# Patient Record
Sex: FEMALE | Race: WHITE | Employment: OTHER | URBAN - METROPOLITAN AREA
[De-identification: names, ages, dates, MRNs, and addresses within clinical notes are randomized per-mention and may not be internally consistent; named-entity substitution may affect disease eponyms.]

---

## 2021-06-18 ENCOUNTER — HOSPITAL ENCOUNTER (INPATIENT)
Age: 66
LOS: 10 days | Discharge: HOME | DRG: 947 | End: 2021-06-28
Attending: PHYSICAL MEDICINE & REHABILITATION | Admitting: PHYSICAL MEDICINE & REHABILITATION
Payer: MEDICARE

## 2021-06-18 PROBLEM — K92.2 UPPER GI BLEED: Status: ACTIVE | Noted: 2021-06-18

## 2021-06-18 LAB
GLUCOSE BLD-MCNC: 137 MG/DL (ref 70–99)
GLUCOSE BLD-MCNC: 147 MG/DL (ref 70–99)
PERFORMED ON: ABNORMAL
PERFORMED ON: ABNORMAL

## 2021-06-18 PROCEDURE — 1280000000 HC REHAB R&B

## 2021-06-18 PROCEDURE — 94760 N-INVAS EAR/PLS OXIMETRY 1: CPT

## 2021-06-18 PROCEDURE — 6370000000 HC RX 637 (ALT 250 FOR IP): Performed by: PHYSICAL MEDICINE & REHABILITATION

## 2021-06-18 RX ORDER — PANTOPRAZOLE SODIUM 40 MG/1
40 TABLET, DELAYED RELEASE ORAL
COMMUNITY

## 2021-06-18 RX ORDER — INSULIN GLARGINE 100 [IU]/ML
15 INJECTION, SOLUTION SUBCUTANEOUS NIGHTLY
Status: DISCONTINUED | OUTPATIENT
Start: 2021-06-18 | End: 2021-06-21

## 2021-06-18 RX ORDER — ACETAMINOPHEN 325 MG/1
650 TABLET ORAL EVERY 6 HOURS PRN
Status: DISCONTINUED | OUTPATIENT
Start: 2021-06-18 | End: 2021-06-28 | Stop reason: HOSPADM

## 2021-06-18 RX ORDER — DEXTROSE MONOHYDRATE 25 G/50ML
12.5 INJECTION, SOLUTION INTRAVENOUS PRN
Status: DISCONTINUED | OUTPATIENT
Start: 2021-06-18 | End: 2021-06-28 | Stop reason: HOSPADM

## 2021-06-18 RX ORDER — METOPROLOL SUCCINATE 50 MG/1
50 TABLET, EXTENDED RELEASE ORAL 2 TIMES DAILY
Status: ON HOLD | COMMUNITY
End: 2021-06-21 | Stop reason: ALTCHOICE

## 2021-06-18 RX ORDER — POLYETHYLENE GLYCOL 3350 17 G/17G
17 POWDER, FOR SOLUTION ORAL DAILY PRN
Status: DISCONTINUED | OUTPATIENT
Start: 2021-06-18 | End: 2021-06-28 | Stop reason: HOSPADM

## 2021-06-18 RX ORDER — HYDRALAZINE HYDROCHLORIDE 25 MG/1
25 TABLET, FILM COATED ORAL EVERY 6 HOURS PRN
Status: DISCONTINUED | OUTPATIENT
Start: 2021-06-18 | End: 2021-06-28 | Stop reason: HOSPADM

## 2021-06-18 RX ORDER — DEXTROSE MONOHYDRATE 50 MG/ML
100 INJECTION, SOLUTION INTRAVENOUS PRN
Status: DISCONTINUED | OUTPATIENT
Start: 2021-06-18 | End: 2021-06-28 | Stop reason: HOSPADM

## 2021-06-18 RX ORDER — ATORVASTATIN CALCIUM 40 MG/1
40 TABLET, FILM COATED ORAL NIGHTLY
COMMUNITY

## 2021-06-18 RX ORDER — ONDANSETRON 4 MG/1
4 TABLET, FILM COATED ORAL EVERY 8 HOURS PRN
COMMUNITY

## 2021-06-18 RX ORDER — LANOLIN ALCOHOL/MO/W.PET/CERES
3 CREAM (GRAM) TOPICAL NIGHTLY PRN
Status: DISCONTINUED | OUTPATIENT
Start: 2021-06-18 | End: 2021-06-28 | Stop reason: HOSPADM

## 2021-06-18 RX ORDER — MULTIVIT WITH MINERALS/LUTEIN
250 TABLET ORAL DAILY
COMMUNITY

## 2021-06-18 RX ORDER — BISACODYL 10 MG
10 SUPPOSITORY, RECTAL RECTAL DAILY PRN
Status: DISCONTINUED | OUTPATIENT
Start: 2021-06-18 | End: 2021-06-28 | Stop reason: HOSPADM

## 2021-06-18 RX ORDER — NIFEDIPINE 60 MG/1
60 TABLET, EXTENDED RELEASE ORAL DAILY
Status: ON HOLD | COMMUNITY
End: 2021-06-28 | Stop reason: HOSPADM

## 2021-06-18 RX ORDER — CETIRIZINE HYDROCHLORIDE 10 MG/1
10 TABLET ORAL DAILY
COMMUNITY

## 2021-06-18 RX ORDER — ATORVASTATIN CALCIUM 40 MG/1
40 TABLET, FILM COATED ORAL NIGHTLY
Status: DISCONTINUED | OUTPATIENT
Start: 2021-06-18 | End: 2021-06-28 | Stop reason: HOSPADM

## 2021-06-18 RX ORDER — PANTOPRAZOLE SODIUM 40 MG/1
40 TABLET, DELAYED RELEASE ORAL
Status: DISCONTINUED | OUTPATIENT
Start: 2021-06-19 | End: 2021-06-28 | Stop reason: HOSPADM

## 2021-06-18 RX ORDER — SENNA AND DOCUSATE SODIUM 50; 8.6 MG/1; MG/1
1 TABLET, FILM COATED ORAL 2 TIMES DAILY
Status: DISCONTINUED | OUTPATIENT
Start: 2021-06-18 | End: 2021-06-28 | Stop reason: HOSPADM

## 2021-06-18 RX ORDER — CETIRIZINE HYDROCHLORIDE 10 MG/1
10 TABLET ORAL DAILY PRN
Status: DISCONTINUED | OUTPATIENT
Start: 2021-06-18 | End: 2021-06-28 | Stop reason: HOSPADM

## 2021-06-18 RX ORDER — LEVOTHYROXINE SODIUM 0.12 MG/1
125 TABLET ORAL
Status: DISCONTINUED | OUTPATIENT
Start: 2021-06-19 | End: 2021-06-28 | Stop reason: HOSPADM

## 2021-06-18 RX ORDER — POLYETHYLENE GLYCOL 3350 17 G/17G
17 POWDER, FOR SOLUTION ORAL 2 TIMES DAILY PRN
COMMUNITY

## 2021-06-18 RX ORDER — ONDANSETRON 4 MG/1
4 TABLET, FILM COATED ORAL EVERY 8 HOURS PRN
Status: DISCONTINUED | OUTPATIENT
Start: 2021-06-18 | End: 2021-06-28 | Stop reason: HOSPADM

## 2021-06-18 RX ORDER — AZELASTINE 1 MG/ML
1 SPRAY, METERED NASAL 2 TIMES DAILY
COMMUNITY

## 2021-06-18 RX ORDER — ACETAMINOPHEN 325 MG/1
650 TABLET ORAL EVERY 4 HOURS PRN
COMMUNITY

## 2021-06-18 RX ORDER — NIFEDIPINE 60 MG/1
60 TABLET, EXTENDED RELEASE ORAL DAILY
Status: DISCONTINUED | OUTPATIENT
Start: 2021-06-19 | End: 2021-06-21

## 2021-06-18 RX ORDER — SENNA PLUS 8.6 MG/1
2 TABLET ORAL 2 TIMES DAILY PRN
COMMUNITY

## 2021-06-18 RX ORDER — NICOTINE POLACRILEX 4 MG
15 LOZENGE BUCCAL PRN
Status: DISCONTINUED | OUTPATIENT
Start: 2021-06-18 | End: 2021-06-28 | Stop reason: HOSPADM

## 2021-06-18 RX ADMIN — ATORVASTATIN CALCIUM 40 MG: 40 TABLET, FILM COATED ORAL at 21:08

## 2021-06-18 RX ADMIN — INSULIN LISPRO 1 UNITS: 100 INJECTION, SOLUTION INTRAVENOUS; SUBCUTANEOUS at 18:05

## 2021-06-18 RX ADMIN — METOPROLOL TARTRATE 25 MG: 25 TABLET, FILM COATED ORAL at 21:08

## 2021-06-18 RX ADMIN — INSULIN LISPRO 5 UNITS: 100 INJECTION, SOLUTION INTRAVENOUS; SUBCUTANEOUS at 18:06

## 2021-06-18 RX ADMIN — DOCUSATE SODIUM 50 MG AND SENNOSIDES 8.6 MG 1 TABLET: 8.6; 5 TABLET, FILM COATED ORAL at 21:08

## 2021-06-18 RX ADMIN — INSULIN GLARGINE 15 UNITS: 100 INJECTION, SOLUTION SUBCUTANEOUS at 21:08

## 2021-06-18 ASSESSMENT — PAIN SCALES - GENERAL
PAINLEVEL_OUTOF10: 0
PAINLEVEL_OUTOF10: 0

## 2021-06-18 NOTE — ACP (ADVANCE CARE PLANNING)
Advance Care Planning     Advance Care Planning Activator (Inpatient)  Conversation Note      Date of ACP Conversation: 6/18/2021     Conversation Conducted with: Patient with Decision Making Capacity    ACP Activator: Heri Townsend, [address] [facility] Maker:     Current Designated Health Care Decision Maker:     Primary Decision Maker: Efra Davie  [phone]    Today we documented Decision Maker(s) consistent with Legal Next of Kin hierarchy. Care Preferences    Ventilation: \"If you were in your present state of health and suddenly became very ill and were unable to breathe on your own, what would your preference be about the use of a ventilator (breathing machine) if it were available to you? \"      Would the patient desire the use of ventilator (breathing machine)?: yes    \"If your health worsens and it becomes clear that your chance of recovery is unlikely, what would your preference be about the use of a ventilator (breathing machine) if it were available to you? \"     Would the patient desire the use of ventilator (breathing machine)?: No      Resuscitation  \"CPR works best to restart the heart when there is a sudden event, like a heart attack, in someone who is otherwise healthy. Unfortunately, CPR does not typically restart the heart for people who have serious health conditions or who are very sick. \"    \"In the event your heart stopped as a result of an underlying serious health condition, would you want attempts to be made to restart your heart (answer \"yes\" for attempt to resuscitate) or would you prefer a natural death (answer \"no\" for do not attempt to resuscitate)? \" yes       [x] Yes   [] No   Educated Patient / Isma Banda regarding differences between Advance Directives and portable DNR orders.     Length of ACP Conversation in minutes:  10  Conversation Outcomes:  [x] ACP discussion completed  [] Existing advance directive reviewed with patient; no changes to patient's previously recorded wishes  [] New Advance Directive completed  [] Portable Do Not Rescitate prepared for Provider review and signature  [] POLST/POST/MOLST/MOST prepared for Provider review and signature      Follow-up plan:    [] Schedule follow-up conversation to continue planning  [] Referred individual to Provider for additional questions/concerns   [] Advised patient/agent/surrogate to review completed ACP document and update if needed with changes in condition, patient preferences or care setting    [x] This note routed to one or more involved healthcare providers

## 2021-06-18 NOTE — CARE COORDINATION
INITIAL CASE MANAGEMENT ASSESSMENT    Reviewed chart, met with patient to assess possible discharge needs. Explained Case Management role/services. Living Situation: Lives in a 1 story house with  of 46 years. ADLs: Independent PTA     DME: None    PT/OT Recs: TBD     Active Services: None     Transportation:  Jesus to transport- active  PTA     Med[facility]: Mail order [place], [address] [facility]    PCP: Saritha Meredith      HD/PD: N/A    PLAN/COMMENTS: return home with family support. Home Care and SNF list provided. The Plan for Transition of Care is related to the following treatment goals: return home  The Patient was provided with a choice of provider and agrees   with the discharge plan. [x] Yes [] No    Freedom of choice list was provided with basic dialogue that supports the patient's individualized plan of care/goals, treatment preferences and shares the quality data associated with the providers. [x] Yes [] No  SW/CM provided contact information for patient or family to call with any questions. SW/CM will follow and assist as needed. Advanced Care planning completed.

## 2021-06-18 NOTE — H&P
Department of Physical Medicine & Rehabilitation  History & Physical      Patient Identification:  Doneta Hamman  : 1955  Admit date: 2021   Attending provider: Gerald Herrera MD        Primary care provider: Karolina Garcia     Chief Complaint: weakness and fatigue    History of Present Illness/Hospital Course:  Patient is a 73 yo F with pmh DM2 and hypothyroidism who intitially presented to [facility] on 2021 with dark diarrhea, n/v, and progressive weakness. She did have a fall prior to presenting to the ED, fell backwards and did hit the back of her head. Found to have upper GI bleed. EGD revealed ulcer at GE junction, minimal gastric erosion in the antrum. She was started on ppi and Venofer. Of note, patient has been taking NSAIDs daily for 3 months due to back pain related to T12 compression fracture. Course was complicated by SILVESTRE, NSTEMI, DD2 on TTE. 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 Now presents to ARU with impaired mobility and self-care below her baseline. Currently, patient reports generalized weakness and fatigue. She has mild back pain. She denies abdominal pain, n/v, blood in stool.  She is motivated to start inpatient rehab program.     Prior Level of Function:  Independent for mobility, ADLs, and IADLs    Current Level of Function:  CGA for mobility and ADLs    Pertinent Social History:  Support: lives with   Home set-up: 1 level home with 3 steps to enter    Past Medical History:   Diagnosis Date    CAD (coronary artery disease)     Diabetes mellitus ([address]     Hyperlipidemia     Shingles     Thyroid disease     Hypothyroidism / goiter       Past Surgical History:   Procedure Laterality Date    CHOLECYSTECTOMY      EYE SURGERY      SINUS SURGERY         Family History   Problem Relation Age of Onset    Heart Disease Mother     Diabetes Father        Social History     Socioeconomic History    Marital status:  Spouse name: Not on file    Number of children: Not on file    Years of education: Not on file    Highest education level: Not on file   Occupational History    Not on file   Tobacco Use    Smoking status: Former Smoker     Quit date: 1985     Years since quittin.4    Smokeless tobacco: Never Used   Substance and Sexual Activity    Alcohol use: Yes     Comment: seldom    Drug use: No    Sexual activity: Not on file   Other Topics Concern    Not on file   Social History Narrative    Not on file     Social Determinants of Health     Financial Resource Strain:     Difficulty of Paying Living Expenses:    Food Insecurity:     Worried About [address] in the Last Year:     [address] [place] in the Last Year:    Transportation Needs:     Lack of Transportation (Medical):  Lack of Transportation (Non-Medical):    Physical Activity:     Days of Exercise per Week:     Minutes of Exercise per Session:    Stress:     Feeling of Stress :    Social Connections:     Frequency of Communication with Friends and Family:     Frequency of Social Gatherings with Friends and Family:     Attends Latter day Services:     Active Member of Clubs or Organizations:     Attends Club or Organization Meetings:     Marital Status:    Intimate Partner Violence:     Fear of Current or Ex-Partner:     Emotionally Abused:     Physically Abused:     Sexually Abused:         Allergies   Allergen Reactions    Lisinopril     Metformin And Related          Current Facility-Administered Medications   Medication Dose Route Frequency Provider Last Rate Last Admin    atorvastatin (LIPITOR) tablet 40 mg  40 mg Oral Nightly Dimitry Weir MD        metoprolol tartrate (LOPRESSOR) tablet 25 mg  25 mg Oral BID Dimitry Weir MD        [START ON 2021] NIFEdipine (PROCARDIA XL) extended release tablet 60 mg  60 mg Oral Daily Dimitry Weir MD        [START ON 2021] pantoprazole ([address]) HEENT: negative for hearing loss, tinnitus, ear drainage, sinus pressure, nasal congestion, epistaxis and snoring. RESPIRATORY: Negative for hemoptysis, cough, sputum production. CARDIOVASCULAR: negative for chest pain, palpitations, exertional chest pressure/discomfort, syncope, edema   GASTROINTESTINAL: refer to HPI  GENITOURINARY: negative for frequency, dysuria, urinary incontinence, decreased urine volume, and hematuria. HEMATOLOGIC/LYMPHATIC: negative for easy bruising, bleeding and lymphadenopathy. ALLERGIC/IMMUNOLOGIC: negative for recurrent infections, angioedema, anaphylaxis and drug reactions. ENDOCRINE: negative for weight changes and diabetic symptoms including polyuria, polydipsia and polyphagia. MUSCULOSKELETAL: negative for joint swelling, decreased range of motion. +back pain  NEUROLOGICAL: negative for headaches, slurred speech, unilateral weakness. PSYCHIATRIC/BEHAVIORAL: negative for hallucinations, behavioral problems, confusion and agitation. All pertinent positives are noted in the HPI. Physical Examination:  Vitals:   Patient Vitals for the past 24 hrs:   BP Temp Temp src Pulse Resp SpO2 Height   06/18/21 1649 -- -- -- -- 14 100 % --   06/18/21 1530 130/73 97.6 °F (36.4 °C) Oral 60 18 98 % 5' 3\" (1.6 m)       Const: Alert. WDWN. No distress  Eyes: Conjunctiva noninjected, no icterus noted; pupils equal, round, and reactive to light. HENT: Atraumatic, normocephalic; Oral mucosa moist  Neck: Trachea midline, neck supple. No thyromegaly noted. CV: Regular rate and rhythm, no murmur rub or gallop noted  Resp: Lungs clear to auscultation bilaterally, no rales wheezes or rhonchi, no retractions. Respirations unlabored. GI: Soft, nontender, nondistended. Normal bowel sounds. No palpable masses. Skin: Normal temperature and turgor. No rashes or breakdown noted. Ext: +LE edema. No varicosities. MSK: No joint tenderness, erythema, warmth noted. Decreased spine ROM. Neuro:   -Mental status: Alert. Oriented to person, place, time, situation.   -Language: Speech fluent  -Cranial nerves: VFF, PERRL, EOMI, Facial sensation intact, Face symmetric, Hearing intact, Palate elevation symmetric, Shoulder shrug intact. Tongue midline.   -Sensation intact to light touch. -Motor examination reveals strength 5-/5 in BUE and BLE except 4/5 hip flexion.   -No abnormalities with finger/nose noted. -Reflexes diminished, symmetric. Negative Vanda  Psych: Stable mood, normal judgement, normal affect     No results found for: WBC, HGB, HCT, MCV, PLT  No results found for: INR, PROTIME  No results found for: CREATININE, BUN, NA, K, CL, CO2  No results found for: ALT, AST, GGT, ALKPHOS, BILITOT    Available laboratory, medical testing, and imaging data from [facility] has been reviewed. WBC 17.8, Hgb 7.7, Plt 470  Na 138, K 3.8, BUN 51, Cr 2.04    CT head  Stable appearance of the tentorium which may represent a normal variant rather than bilateral tentorial subdural hematomas. CT abdomen/pelvis  No significant abnormality on CT of abdomen and pelvis   Normal appendix   No evidence of renal stone or obstruction    Xray thoracic spine  Significant compression deformity of the T12 vertebral body with kyphotic angulation. Posterior contour bulge demonstrated. Correlation with MRI of the lumbar spine to include this level is recommended for additional characterization    TTE  Overall left ventricular ejection fraction is estimated to be 50-55%. The left ventricular wall motion is normal.   There is normal left ventricular wall thickness. The left atrium is dilated. The Aortic Valve leaflets appear sclerotic. No hemodynamically significant valvular aortic stenosis. The diastolic function is impaired and classified as Grade 2   (psuedonormalization). Right ventricular systolic pressure is mildly elevated at 35-45 mmHg. The IVC size is normal.   Contrast injection was performed. EKG  ECG IMPRESSION   - BORDERLINE ECG -        ECG IMPRESSION   SR-Sinus rhythm-normal P axis, V-rate 50-99        ECG IMPRESSION   APC-Atrial premature complex-SV complex w/ short R-R interval        ECG IMPRESSION   LVH-Left ventricular hypertrophy-multiple voltage criteria            Body mass index is 23.03 kg/m². POST ADMISSION PHYSICIAN EVALUATION  The patient has agreed to being admitted to our comprehensive inpatient rehabilitation facility and can tolerate the intensity of service consisting of at least:  --180 minutes of therapy a day, 5 out of 7 days a week. OR  --15 hours of intensive therapy within a 7 consecutive day period. The patient/family has a good understanding of our discharge process and will benefit from an interdisciplinary inpatient rehabilitation program. The patient has potential to make improvement and is in need of at least two of the following multidisciplinary therapies including but not limited to physical, occupational, respiratory, and speech, nutritional services, wound care, and prosthetics and orthotics. Given the patients complex condition and risk of further medical complications, rehabilitation services cannot be safely provided at a lower level of care such as a skilled nursing facility. All of the goals listed below were reviewed with the patient and he/she is in agreement. I have compared the patients medical and functional status at the time of the preadmission screening and the same on this date, and there are no significant changes. By signing this document, I acknowledge that I have personally performed a full physical examination on this patient within 24 hours of admission to this inpatient rehabilitation facility and have determined the patient to be able to tolerate the above course of treatment at an intensive level for a reasonable period of time.  I will be completing a detailed individualized  Plan of Care for this patient by day four of the patients stay based upon the Preadmission Screen, this Post-Admission Evaluation, and the therapy evaluations. Barriers: generalized weakness, decreased balance, endurance, spine ROM, medical comorbidities  Services Required: PT, OT  Goals: Michelle for mobility and ADLs  Prognosis: Good  Anticipated Dispo: home with   ELOS: 5-7 days    Rehabilitation Diagnosis:   16.0, Debility (non-cardiac, non-pulmonary      Assessment and Plan:    Impairments:  generalized weakness, decreased balance, endurance, spine ROM    Debility  -PT/OT    Upper GI bleed  -EGD with gastric ulcer, antral erosions  -NSAIDs dc'ed  -ppi    Acute blood loss anemia   -Due to GIB. -Monitor Hgb, transfuse prn <7. SILVESTRE  -Possibly AIN  -Avoid nephrotoxins, renally dose meds  -Monitor renal function    Leukocytosis with eosinophilia  -Per [facility], possibly due to AIN  -Monitor for signs/symptoms of infection    Subacute T12 compression fracture  -Pain control with acetaminophen and lidoderm prn  -PT/OT    NSTEMI type II  -statin, bb    Diastolic dysfunction on TTE  -Daily wt    HTN  -metoprolol, nifedipine    DM2  -Lantus 15 qhs, prandial 5 TID, ISS    Hypothyroidism  -levothyroxine    Bladder   -High risk retention   -Monitor PVRs, SC prn >300cc    Bowel   -High risk constipation   -senna+colace BID, PRN miralax, MoM, and bisacodyl supp. Safety   -fall precautions    PPx  -DVT: Hold due to bleed risk  -GI: pantoprazole    FULL CODE    Purnima Cope MD 6/18/2021, 6:42 PM

## 2021-06-19 ENCOUNTER — APPOINTMENT (OUTPATIENT)
Dept: GENERAL RADIOLOGY | Age: 66
DRG: 947 | End: 2021-06-19
Attending: PHYSICAL MEDICINE & REHABILITATION
Payer: MEDICARE

## 2021-06-19 LAB
ANION GAP SERPL CALCULATED.3IONS-SCNC: 10 MMOL/L (ref 3–16)
ANISOCYTOSIS: ABNORMAL
BACTERIA: ABNORMAL /HPF
BANDED NEUTROPHILS RELATIVE PERCENT: 12 % (ref 0–7)
BASOPHILS ABSOLUTE: 0 K/UL (ref 0–0.2)
BASOPHILS RELATIVE PERCENT: 0 %
BILIRUBIN URINE: NEGATIVE
BLOOD, URINE: ABNORMAL
BUN BLDV-MCNC: 50 MG/DL (ref 7–20)
CALCIUM SERPL-MCNC: 8.7 MG/DL (ref 8.3–10.6)
CHLORIDE BLD-SCNC: 106 MMOL/L (ref 99–110)
CLARITY: ABNORMAL
CO2: 23 MMOL/L (ref 21–32)
COLOR: YELLOW
CREAT SERPL-MCNC: 2.1 MG/DL (ref 0.6–1.2)
EOSINOPHILS ABSOLUTE: 0.2 K/UL (ref 0–0.6)
EOSINOPHILS RELATIVE PERCENT: 1 %
EPITHELIAL CELLS, UA: 1 /HPF (ref 0–5)
GFR AFRICAN AMERICAN: 28
GFR NON-AFRICAN AMERICAN: 23
GLUCOSE BLD-MCNC: 151 MG/DL (ref 70–99)
GLUCOSE BLD-MCNC: 184 MG/DL (ref 70–99)
GLUCOSE BLD-MCNC: 194 MG/DL (ref 70–99)
GLUCOSE BLD-MCNC: 72 MG/DL (ref 70–99)
GLUCOSE BLD-MCNC: 84 MG/DL (ref 70–99)
GLUCOSE URINE: 100 MG/DL
HCT VFR BLD CALC: 22.1 % (ref 36–48)
HEMOGLOBIN: 7.4 G/DL (ref 12–16)
HYALINE CASTS: 7 /LPF (ref 0–8)
KETONES, URINE: NEGATIVE MG/DL
LACTIC ACID: 1.4 MMOL/L (ref 0.4–2)
LEUKOCYTE ESTERASE, URINE: ABNORMAL
LYMPHOCYTES ABSOLUTE: 1.3 K/UL (ref 1–5.1)
LYMPHOCYTES RELATIVE PERCENT: 7 %
MACROCYTES: ABNORMAL
MCH RBC QN AUTO: 29.1 PG (ref 26–34)
MCHC RBC AUTO-ENTMCNC: 33.7 G/DL (ref 31–36)
MCV RBC AUTO: 86.2 FL (ref 80–100)
METAMYELOCYTES RELATIVE PERCENT: 2 %
MICROSCOPIC EXAMINATION: YES
MONOCYTES ABSOLUTE: 1.7 K/UL (ref 0–1.3)
MONOCYTES RELATIVE PERCENT: 9 %
MYELOCYTE PERCENT: 1 %
NEUTROPHILS ABSOLUTE: 15.5 K/UL (ref 1.7–7.7)
NEUTROPHILS RELATIVE PERCENT: 68 %
NITRITE, URINE: NEGATIVE
PDW BLD-RTO: 14.5 % (ref 12.4–15.4)
PERFORMED ON: ABNORMAL
PERFORMED ON: NORMAL
PH UA: 5.5 (ref 5–8)
PLATELET # BLD: 468 K/UL (ref 135–450)
PLATELET SLIDE REVIEW: ABNORMAL
PMV BLD AUTO: 6.7 FL (ref 5–10.5)
POLYCHROMASIA: ABNORMAL
POTASSIUM REFLEX MAGNESIUM: 4 MMOL/L (ref 3.5–5.1)
PREALBUMIN: 10.5 MG/DL (ref 20–40)
PROCALCITONIN: 1.27 NG/ML (ref 0–0.15)
PROTEIN UA: 100 MG/DL
RBC # BLD: 2.56 M/UL (ref 4–5.2)
RBC UA: 30 /HPF (ref 0–4)
SLIDE REVIEW: ABNORMAL
SODIUM BLD-SCNC: 139 MMOL/L (ref 136–145)
SPECIFIC GRAVITY UA: 1.01 (ref 1–1.03)
TOXIC GRANULATION: PRESENT
URINE REFLEX TO CULTURE: YES
URINE TYPE: ABNORMAL
UROBILINOGEN, URINE: 0.2 E.U./DL
WBC # BLD: 18.7 K/UL (ref 4–11)
WBC UA: 60 /HPF (ref 0–5)

## 2021-06-19 PROCEDURE — 6370000000 HC RX 637 (ALT 250 FOR IP): Performed by: PHYSICAL MEDICINE & REHABILITATION

## 2021-06-19 PROCEDURE — 97110 THERAPEUTIC EXERCISES: CPT

## 2021-06-19 PROCEDURE — 85025 COMPLETE CBC W/AUTO DIFF WBC: CPT

## 2021-06-19 PROCEDURE — 94760 N-INVAS EAR/PLS OXIMETRY 1: CPT

## 2021-06-19 PROCEDURE — 97530 THERAPEUTIC ACTIVITIES: CPT

## 2021-06-19 PROCEDURE — 81001 URINALYSIS AUTO W/SCOPE: CPT

## 2021-06-19 PROCEDURE — 97112 NEUROMUSCULAR REEDUCATION: CPT

## 2021-06-19 PROCEDURE — 1280000000 HC REHAB R&B

## 2021-06-19 PROCEDURE — 80048 BASIC METABOLIC PNL TOTAL CA: CPT

## 2021-06-19 PROCEDURE — 84134 ASSAY OF PREALBUMIN: CPT

## 2021-06-19 PROCEDURE — 87086 URINE CULTURE/COLONY COUNT: CPT

## 2021-06-19 PROCEDURE — 83605 ASSAY OF LACTIC ACID: CPT

## 2021-06-19 PROCEDURE — 97161 PT EVAL LOW COMPLEX 20 MIN: CPT

## 2021-06-19 PROCEDURE — 87040 BLOOD CULTURE FOR BACTERIA: CPT

## 2021-06-19 PROCEDURE — 84145 PROCALCITONIN (PCT): CPT

## 2021-06-19 PROCEDURE — 97165 OT EVAL LOW COMPLEX 30 MIN: CPT

## 2021-06-19 PROCEDURE — 36415 COLL VENOUS BLD VENIPUNCTURE: CPT

## 2021-06-19 PROCEDURE — 97535 SELF CARE MNGMENT TRAINING: CPT

## 2021-06-19 PROCEDURE — 71045 X-RAY EXAM CHEST 1 VIEW: CPT

## 2021-06-19 RX ORDER — CEFDINIR 300 MG/1
300 CAPSULE ORAL EVERY 12 HOURS SCHEDULED
Status: DISCONTINUED | OUTPATIENT
Start: 2021-06-19 | End: 2021-06-19

## 2021-06-19 RX ORDER — CEFDINIR 300 MG/1
300 CAPSULE ORAL EVERY 12 HOURS SCHEDULED
Status: DISCONTINUED | OUTPATIENT
Start: 2021-06-19 | End: 2021-06-21

## 2021-06-19 RX ORDER — LIDOCAINE 4 G/G
1 PATCH TOPICAL DAILY PRN
Status: DISCONTINUED | OUTPATIENT
Start: 2021-06-19 | End: 2021-06-28 | Stop reason: HOSPADM

## 2021-06-19 RX ADMIN — INSULIN LISPRO 5 UNITS: 100 INJECTION, SOLUTION INTRAVENOUS; SUBCUTANEOUS at 16:34

## 2021-06-19 RX ADMIN — CEFDINIR 300 MG: 300 CAPSULE ORAL at 21:16

## 2021-06-19 RX ADMIN — INSULIN LISPRO 1 UNITS: 100 INJECTION, SOLUTION INTRAVENOUS; SUBCUTANEOUS at 21:17

## 2021-06-19 RX ADMIN — POLYETHYLENE GLYCOL 3350 17 G: 17 POWDER, FOR SOLUTION ORAL at 07:22

## 2021-06-19 RX ADMIN — LEVOTHYROXINE SODIUM 125 MCG: 0.12 TABLET ORAL at 06:14

## 2021-06-19 RX ADMIN — INSULIN GLARGINE 15 UNITS: 100 INJECTION, SOLUTION SUBCUTANEOUS at 21:16

## 2021-06-19 RX ADMIN — METOPROLOL TARTRATE 25 MG: 25 TABLET, FILM COATED ORAL at 07:21

## 2021-06-19 RX ADMIN — PANTOPRAZOLE SODIUM 40 MG: 40 TABLET, DELAYED RELEASE ORAL at 06:14

## 2021-06-19 RX ADMIN — INSULIN LISPRO 5 UNITS: 100 INJECTION, SOLUTION INTRAVENOUS; SUBCUTANEOUS at 12:11

## 2021-06-19 RX ADMIN — INSULIN LISPRO 1 UNITS: 100 INJECTION, SOLUTION INTRAVENOUS; SUBCUTANEOUS at 16:34

## 2021-06-19 RX ADMIN — NIFEDIPINE 60 MG: 60 TABLET, EXTENDED RELEASE ORAL at 07:21

## 2021-06-19 RX ADMIN — DOCUSATE SODIUM 50 MG AND SENNOSIDES 8.6 MG 1 TABLET: 8.6; 5 TABLET, FILM COATED ORAL at 07:21

## 2021-06-19 RX ADMIN — METOPROLOL TARTRATE 25 MG: 25 TABLET, FILM COATED ORAL at 21:16

## 2021-06-19 RX ADMIN — INSULIN LISPRO 5 UNITS: 100 INJECTION, SOLUTION INTRAVENOUS; SUBCUTANEOUS at 07:22

## 2021-06-19 RX ADMIN — INSULIN LISPRO 1 UNITS: 100 INJECTION, SOLUTION INTRAVENOUS; SUBCUTANEOUS at 12:11

## 2021-06-19 RX ADMIN — ATORVASTATIN CALCIUM 40 MG: 40 TABLET, FILM COATED ORAL at 21:16

## 2021-06-19 ASSESSMENT — PAIN SCALES - GENERAL
PAINLEVEL_OUTOF10: 0
PAINLEVEL_OUTOF10: 0

## 2021-06-19 NOTE — PROGRESS NOTES
Department of Physical Medicine & Rehabilitation  Progress Note    Patient Identification:  Lori Cedillo  [id number]  : 1955  Admit date: 2021    Chief Complaint: Upper GI bleed    Subjective:   No acute events overnight. Patient seen this am ambulating with therapy. She has slightly slow álvaro and slightly impaired balance, but overall doing well. She denies f/c, cough, urinary symptoms. She had normal BM this morning. Labs reviewed. ROS: No f/c, n/v, cp     Objective:  Patient Vitals for the past 24 hrs:   BP Temp Temp src Pulse Resp SpO2 Height Weight   21 0721 (!) 158/73 -- -- 97 -- -- -- --   21 0526 (!) 163/71 97.7 °F (36.5 °C) Oral 85 16 96 % -- 135 lb 9.3 oz (61.5 kg)   21 2108 (!) 145/70 -- -- 81 -- -- -- --   21 1649 -- -- -- -- 14 100 % -- --   21 1530 130/73 97.6 °F (36.4 °C) Oral 60 18 98 % 5' 3\" (1.6 m) --     Const: Alert. No distress, pleasant. HEENT: Normocephalic, atraumatic. Normal sclera/conjunctiva. MMM. CV: Regular rate and rhythm. Resp: No respiratory distress. Lungs CTAB. Abd: Soft, nontender, nondistended, NABS+   Ext: +LE edema  Neuro: Alert, oriented, appropriately interactive. Psych: Cooperative, appropriate mood and affect    Laboratory data: Available via EMR.    Last 24 hour lab  Recent Results (from the past 24 hour(s))   POCT Glucose    Collection Time: 21  4:25 PM   Result Value Ref Range    POC Glucose 147 (H) 70 - 99 mg/dl    Performed on ACCU-CHEK    POCT Glucose    Collection Time: 21  9:00 PM   Result Value Ref Range    POC Glucose 137 (H) 70 - 99 mg/dl    Performed on ACCU-CHEK    Basic Metabolic Panel w/ Reflex to MG    Collection Time: 21  5:19 AM   Result Value Ref Range    Sodium 139 136 - 145 mmol/L    Potassium reflex Magnesium 4.0 3.5 - 5.1 mmol/L    Chloride 106 99 - 110 mmol/L    CO2 23 21 - 32 mmol/L    Anion Gap 10 3 - 16    Glucose 72 70 - 99 mg/dL    BUN 50 (H) 7 - 20 mg/dL Plan:     Impairments:  generalized weakness, decreased balance, endurance, spine ROM     Debility  -PT/OT     Upper GI bleed  -EGD with gastric ulcer, antral erosions  -NSAIDs dc'ed  -ppi     Acute blood loss anemia   -Due to GIB. -Monitor Hgb, transfuse prn <7. Hgb 7.4     SILVESTRE  -Possibly AIN  -Avoid nephrotoxins, renally dose meds  -Monitor renal function. Cr 2.1     Leukocytosis with eosinophilia  -Per [facility], possibly due to AIN  -WBC still increasing, she remains asymptomatic  ---Check CXR, UA/Cx, lactic acid, procalcitonin, blood cultures.      Subacute T12 compression fracture  -Pain control with acetaminophen and lidoderm prn  -PT/OT     NSTEMI type II  -statin, bb     Diastolic dysfunction on TTE  -Daily wt     HTN  -metoprolol, nifedipine     DM2  -Lantus 15 qhs, prandial 5 TID, ISS     Hypothyroidism  -levothyroxine     Bladder   -High risk retention   -Monitor PVRs, SC prn >300cc     Bowel   -High risk constipation   -senna+colace BID, PRN miralax, MoM, and bisacodyl supp.     Safety   -fall precautions     PPx  -DVT: Hold due to bleed risk  -GI: pantoprazole       Rehab Progress: Making progress. Working on strength, endurance, balance. Anticipated Dispo: home with   Services/DME: TBD  ELOS: 5-7 days      Rylee Wiggins.  Karmen Cope MD 6/19/2021, 10:12 AM

## 2021-06-19 NOTE — PROGRESS NOTES
Patient admitted to rehab with Debility s/p UTI. A/A/O x 4. Transfers with SBA w/ gait belt. On Carb control diet, tolerating well. Medications taken whole. On Lovenox for DVT prophylaxis. Skin intact. On room air. Has been continent of bowel and bladder. LBM 6/19. Chair/bed alarms in use and call light in reach. Will monitor for safety.

## 2021-06-19 NOTE — PLAN OF CARE
Problem: Falls - Risk of:  Goal: Will remain free from falls  Description: Will remain free from falls  Note: Fall risk band on patient. Yellow light on outside of room. Non skid footwear in place. Alarms used appropriately. Patient instructed to call and wait for staff before getting up. Rounding done to anticipate needs. Appropriate safety devices used for transfers.

## 2021-06-19 NOTE — PROGRESS NOTES
Pt awake and AAO sitting up in chair requesting to use the BR and get into bed. Pt denies pain. Pt admitted to rehab for strengthening after ulcer and a fall at home resulting in SDH. Lungs clear. No sob or cough. On RA. Belly round and soft with active BS. Pt continent B and B. 1+ edema to lower legs. Pt up from sit to stand with GB and CGA. Pt is slightly unsteady on her feet. Voided urine and returned to bed. Toilets with CGA.  at bedside. Call light in reach. Bed alarm on.

## 2021-06-19 NOTE — CONSULTS
Nutrition Assessment     Type and Reason for Visit: Initial, Consult    Nutrition Recommendations/Plan:   Carb Control diet   Will monitor nutritional adequacy, nutrition-related labs, weights, BMs, and clinical progress     Nutrition Assessment:  Consult for new rehab admission. Pt admitted with upper GI bleed. Pt reports she is a light eater and appetite is at baseline. Tolerating diet. Wt stable. Denied any questions about carb controlled diet. Patient assessed for nutritional risk. Deemed to be at low risk at this time. Will continue to monitor for changes in status. Malnutrition Assessment:  Malnutrition Status: No malnutrition     Nutrition Related Findings: Reviewed labs      Current Nutrition Therapies:    ADULT DIET;  Regular; 4 carb choices (60 gm/meal)    Anthropometric Measures:  · Height: 5' 3\" (160 cm)  · Current Body Wt: 135 lb (61.2 kg)   · BMI: 23.9    Nutrition Diagnosis:   No nutrition diagnosis at this time     Nutrition Interventions:   Food and/or Nutrient Delivery:  Continue Current Diet  Nutrition Education/Counseling:  No recommendation at this time   Coordination of Nutrition Care:  Continue to monitor while inpatient    Goals:  Consume >50% of meals this admission       Nutrition Monitoring and Evaluation:   Behavioral-Environmental Outcomes:  None Identified   Food/Nutrient Intake Outcomes:  Food and Nutrient Intake  Physical Signs/Symptoms Outcomes:  Biochemical Data, Nutrition Focused Physical Findings, Skin, Weight, GI Status     Electronically signed by Pamela Hull RD, GIOVANNI on 6/19/21 at 1:09 PM EDT    Contact: [phone]

## 2021-06-19 NOTE — PLAN OF CARE
Occupational Therapy  ARU PATIENT TREATMENT PLAN  [facility]   [address] [facility] [place]  [phone]    Brittany Aranda    : 1955  Acct #: [de-identified]  MRN: [id number]   PHYSICIAN:  Roseann Wharton MD    Rehabilitation Diagnosis:    Debility  -PT/OT     Upper GI bleed  -EGD with gastric ulcer, antral erosions  -NSAIDs dc'ed  -ppi     Acute blood loss anemia   -Due to GIB. -Monitor Hgb, transfuse prn <7.      SILVESTRE  -Possibly AIN  -Avoid nephrotoxins, renally dose meds  -Monitor renal function     Leukocytosis with eosinophilia  -Per Good Michael, possibly due to AIN  -Monitor for signs/symptoms of infection     Subacute T12 compression fracture  -Pain control with acetaminophen and lidoderm prn  -PT/OT     NSTEMI type II  -statin, bb     Diastolic dysfunction on TTE  -Daily wt     HTN  -metoprolol, nifedipine     DM2  -Lantus 15 qhs, prandial 5 TID, ISS     Hypothyroidism  -levothyroxine     Bladder   -High risk retention   -Monitor PVRs, SC prn >300cc     Bowel   -High risk constipation   -senna+colace BID, PRN miralax, MoM, and bisacodyl supp.     Safety   -fall precautions     PPx  -DVT: Hold due to bleed risk  -GI: pantoprazole      ADMIT DATE:2021    Patient Goals:  To improve her balance and core strength    Admitting Impairments:  generalized weakness, decreased balance, endurance, spine ROM  Barriers: generalized weakness, decreased balance, endurance, spine ROM, medical comorbidities  Participation: patient lives with her , is independent, retired, and enjoys playing golf          CARE PLAN     NURSING:  Brittany Aranda while on this unit will:     [x] Be continent of bowel and bladder     [x] Have an adequate number of bowel movements  [x] Urinate with no urinary retention >300ml in bladder  [] Complete bladder protocol with rodriguez removal  [x] Maintain O2 SATs at 92%  [x] Have pain managed while on ARU       [] Be pain free by discharge   [x] Have no skin breakdown while on ARU  [] Have improved skin integrity via wound measurements  [] Have no signs/symptoms of infection at the wound site  [x] Be free from injury during hospitalization   [x] Complete education with patient/family with understanding demonstrated for:  [x] Adjustment   [x] Other: Diabetic needs, insulin. Nursing interventions may include bowel/bladder training, education for medical assistive devices, medication education, O2 saturation management, energy conservation, stress management techniques, fall prevention, alarms protocol, seating and positioning, skin/wound care, pressure relief instruction,dressing changes,  infection protection, DVT prophylaxis, and/or assistance with in room safety with transfers to bed, toilet, wheelchair, shower as well as bathroom activities and hygiene. Patient/caregiver education for:   [x] Disease/sustained injury/management      [x] Medication Use   [] Surgical intervention   [x] Safety   [x] Body mechanics and or joint protection   [x] Health maintenance         PHYSICAL THERAPY:  Goals:                  Short term goals  Time Frame for Short term goals: In 5 days pt will perform  Short term goal 1: Bed mobility (I)  Short term goal 2: Transfers (I)  Short term goal 3: Ambulation 500' (I) without device  Short term goal 4: Ascend/Descend 12 steps with single HR support, mod I, reciprocal pattern  Short term goal 5: Ascend/Descend curb step independently without device            Long term goals  Time Frame for Long term goals : LTG = STG  These goals were reviewed with this patient at the time of assessment and Kimmie Newman is in agreement. Plan of Care: Pt to be seen 90 mins per day for 5-6 days/week for 5 days.                    Current Treatment Recommendations: Strengthening, Balance Training, Endurance Training, Functional Mobility Training, Transfer Training, Gait Training, Stair training, Positioning, Equipment Evaluation, Education, & procurement, Patient/Caregiver Education & Training, Safety Education & Training, Home Exercise Program, Neuromuscular Re-education      OCCUPATIONAL THERAPY:  Goals:             Short term goals  Time Frame for Short term goals: 5 days  Short term goal 1: complete functional mobility and transfers Ind  Short term goal 2: complete bathing and dressing Ind  Short term goal 3: complete toileting Ind  Short term goal 4: complete grooming in stance at sink Ind  Short term goal 5: Pt will complete simple meal prep Ind :    :    These goals were reviewed with this patient at the time of assessment and Dangelo Sanford is in agreement    Plan of Care:  Pt to be seen 90   mins per day for 5 day/week for 5 days. SPEECH THERAPY: Goals will be left blank if speech is not following this patient. Goals:                                                                                 CASE MANAGEMENT:  Goals: Return Home  Assist patient/family with discharge planning, patient/family counseling,   and coordination with insurance during ARU stay. Admission Period/Goal QIM CODES usual performance per care team  QIM  Admit/Goal Score    Eating  6/6   Oral Hygiene  4/6   [address] [place]  4/6   Shower/Bathe Self  4/6   Upper Body Dressing  4/6   Lower Body Dressing  4/6   Putting on/Taking off Footwear  4/6   Roll Left and Right  4/6   Sit to Lying  4/6   Lying to Sitting on Side of Bed  4/6   Sit to Stand  4/6   Chair/Bed to Chair Transfer  4/6   Toilet Transfer  4/6   Car Transfer  4/6   Walk 10 feet  4/6   Walk 50 feet with 2 Turns  4/6   Walk 150 feet with 2 turns  4/6   Walk 10 feet on Uneven Surfaces  4/6   1 Step  4/6   4 Steps  4/6   12 Steps  4/6   Picking up Object  4/6   Wheel 50 feet with 2 Turns   Type?  na   Wheel 150 feet with 2 Turns   Type?  na          Dangelo Sanford will be seen a minimum of 3 hours of therapy per day, a minimum of 5 out of 7 days per week.     [] In this rare instance due to the nature of this patient's medical involvement, this patient will be seen 15 hours per week (900 minutes within a 7 day period). Treatments may include therapeutic exercises, gait training, neuromuscular re-ed, transfer training, community reintegration, bed mobility, self care, home mgmt, cognitive training, energy conservation,dysphagia tx, speech/language/communication therapy, group therapy, and patient/family education. In addition, dietician/nutritionist may monitor calorie count as well as intake and collaboratively work with SLP on dietary upgrades. Neuropsychology/Psychology may evaluate and provide necessary support. Medical issues being managed closely and that require 24 hour availability of a physician:   [] Swallowing Precautions  [x] Bowel/Bladder Fx  [] Weight bearing precautions   [] Wound Care    [x] Pain Mgmt   [x] Infection Protection   [x] DVT Prophylaxis   [x] Fall Precautions  [x] Fluid/Electrolyte/Nutrition Balance   [] Voice Protection   [] Respiratory  [] Other:    Medical Prognosis: [x] Good  [] Fair    [] Guarded   Total expected IRF days 5-7  Anticipated discharge destination:    [x] Home Independently    [] Home with supervision    []SNF     [] Other                                           Physician anticipated functional outcomes:  Improve gait,transfers, self care to independent to allow safe return home with      IPOC brief synthesis: Patient is a 73 yo F with pmh DM2 and hypothyroidism who intitially presented to [facility] on 6/12/2021 with dark diarrhea, n/v, and progressive weakness. She did have a fall prior to presenting to the ED, fell backwards and did hit the back of her head. Found to have upper GI bleed. EGD revealed ulcer at GE junction, minimal gastric erosion in the antrum. She was started on ppi and Venofer. Of note, patient has been taking NSAIDs daily for 3 months due to back pain related to T12 compression fracture.  Course was complicated by SILVESTRE, NSTEMI, DD2 on TTE. 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 Now presents to ARU with impaired mobility and self-care below her baseline due to debility. She requires comprehensive inpatient rehab program in order to return to community setting. I have reviewed this initial plan of care and agree with its contents:    Title   Name    Date    Time    Physician: Chandrakant Lepe.  Robyn Salas MD 6/21/2021, 10:55 AM      Case Mgmt: Rolo FITZGERALD,MSW    OT: Addy Nine, OTR/L    PT:  Electronically signed by Jeremiah Blank, DWAYNE [id number] on 6/19/2021 at 12:19 PM    RN: Shira Ledesma RN, [address] [place] [address] 06/21/2021 9:04 am    ST:    :  MAXI Perkins  6/21/2021  1509    Other:

## 2021-06-19 NOTE — PROGRESS NOTES
Occupational Therapy   Occupational Therapy Initial Assessment  Date: 2021   Patient Name: Gisele Alston  MRN: [id number]     : 1955    Date of Service: 2021    Discharge Recommendations:  Continue to assess pending progress, Home with assist PRN  OT Equipment Recommendations  Other: possible shower chair; continue to assess    Assessment   Performance deficits / Impairments: Decreased functional mobility ; Decreased endurance;Decreased ADL status; Decreased high-level IADLs;Decreased balance  Assessment: Patient is a 75 yo F with pmh DM2 and hypothyroidism who intitially presented to [facility] on 2021 with dark diarrhea, n/v, and progressive weakness. She did have a fall prior to presenting to the ED, fell backwards and did hit the back of her head. Found to have upper GI bleed. EGD revealed ulcer at GE junction, minimal gastric erosion in the antrum. She was started on ppi and Venofer. Of note, patient has been taking NSAIDs daily for 3 months due to back pain related to T12 compression fracture. Course was complicated by SILVESTRE, NSTEMI, DD2 on TTE. 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 Now presents to ARU with impaired mobility and self-care below her baseline. Currently, patient reports generalized weakness and fatigue. She has mild back pain. She denies abdominal pain, n/v, blood in stool. She is motivated to start inpatient rehab program. PTA pt from home with  where pt was Ind with mobility and ADLs. Pt with some history of falls. Pt currently requires SBA/supervision for mobility and transfers. Anticipate pt needing up to supervision/SBA for ADLs. Pt able to ambulate household distances in room and hallway with no LOB. Pt reports having a basline \"sway\" with walking. Pt reports good support from  at time of D/C. Pt will benefit from skilled OT services at this time.   Treatment Diagnosis: decreased endurance, ADLs, balance, IADLs  Prognosis: Good  Decision Making: Low Complexity  Exam: ROM, strength, balance, sensation, coordination, ADLs/IADLs  OT Education: OT Role;Plan of Care;Transfer Training;ADL Adaptive Strategies; Home Exercise Program  REQUIRES OT FOLLOW UP: Yes  Activity Tolerance  Activity Tolerance: Patient Tolerated treatment well  Safety Devices  Safety Devices in place: Yes  Type of devices: Chair alarm in place;Call light within reach;Gait belt;Nurse notified; Left in chair           Patient Diagnosis(es): There were no encounter diagnoses. has a past medical history of CAD (coronary artery disease), Diabetes mellitus ([facility] [address] Hyperlipidemia, Shingles, and Thyroid disease. has a past surgical history that includes eye surgery; sinus surgery; and Cholecystectomy. Treatment Diagnosis: decreased endurance, ADLs, balance, IADLs      Restrictions       Subjective   General  Chart Reviewed: Yes  Patient assessed for rehabilitation services?: Yes  Additional Pertinent Hx: Patient is a 73 yo F with pmh DM2 and hypothyroidism who intitially presented to [facility] on 6/12/2021 with dark diarrhea, n/v, and progressive weakness. She did have a fall prior to presenting to the ED, fell backwards and did hit the back of her head. Found to have upper GI bleed. EGD revealed ulcer at GE junction, minimal gastric erosion in the antrum. She was started on ppi and Venofer. Of note, patient has been taking NSAIDs daily for 3 months due to back pain related to T12 compression fracture. Course was complicated by SILVESTRE, NSTEMI, DD2 on TTE. 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 Now presents to ARU with impaired mobility and self-care below her baseline. Currently, patient reports generalized weakness and fatigue. She has mild back pain. She denies abdominal pain, n/v, blood in stool.  She is motivated to start inpatient rehab program.  Family / Caregiver Present: Yes ()  Referring Practitioner: Nimo Mulligan MD  Diagnosis: Upper GI Bleed  Subjective  Subjective: Pt agreeable to OT evaluation. Pt reports no pain. Pt reports history of T12 back fx although no longer with brace/restrictions    Social/Functional History  Social/Functional History  Lives With: Spouse  Type of Home: House  Home Layout: One level  Home Access: Stairs to enter without rails  Entrance Stairs - Number of Steps: 2-3 through garage; 5 steps through front/back  Bathroom Shower/Tub: Walk-in shower  Bathroom Toilet: Handicap height  ADL Assistance: Independent  Homemaking Assistance: Independent  Homemaking Responsibilities: Yes  Ambulation Assistance: Independent  Transfer Assistance: Independent  Active : Yes  Occupation: Retired  Type of occupation: [employer]  [address]: [place]       Objective   Vision: Impaired (contact left eye)  Hearing: Within functional limits    Orientation  Overall Orientation Status: Within Functional Limits  Orientation Level: Oriented X4     Balance  Sitting Balance: Supervision  Standing Balance: Stand by assistance  Standing Balance  Time: ~5 minutes  Activity: grooming at sink  Comment: no LOB  Functional Mobility  Functional - Mobility Device: No device  Activity: To/from bathroom; Other (household/community distances in room and hallway; ~25ft x2; ~200ft x2)  Assist Level: Contact guard assistance (CGA/SBA)  Functional Mobility Comments: no overt LOB; no reports of light headedness or dizziness; pt and  state pt with \"swaying\" at baseline  Toilet Transfers  Toilet - Technique: Ambulating  Equipment Used: Grab bars  Toilet Transfer: Supervision;Stand by assistance  Tub Transfers  Tub - Transfer From: Bed  Tub - Transfer Type: To and From  Tub - Transfer To: Standing  Tub - Technique: Ambulating  Tub Transfers: Stand by [employer] - Transfer From: [employer] - Transfer Type: To and From  Shower - Transfer To:  Shower seat with back  Shower - Technique: Ambulating  Shower Transfers: Stand by [facility] [employer]  Wheelchair/Bed - Technique: Ambulating  Equipment Used: Bed;Other (bed to chair)  Level of Asssistance: Stand by assistance;Supervision  ADL  Grooming: Stand by assistance (in stance at sink; wash hands, brush teeth, comb hair)  UE Bathing: Supervision;Setup (seated on chair)  LE Bathing: Stand by assistance;Setup (supervision seated; SBA in stance for buttocks)  UE Dressing: Supervision;Setup (seated)  LE Dressing: Stand by assistance;Setup (seated with supervision to don/doff bilateral socks; SBA for balance in stance to manage pants over hips)  Toileting: Stand by assistance (SBA for balance in stance; pt able to manage pants and complete pericare)  Additional Comments: Anticipate pt needing up to SBA for ADLs including dressing, bathing, and toileting based on ROM, strength, and balance  Tone RUE  RUE Tone: Normotonic  Tone LUE  LUE Tone: Normotonic  Coordination  Movements Are Fluid And Coordinated: Yes     Bed mobility  Supine to Sit: Independent  Sit to Supine: Independent  Scooting: Independent  Transfers  Sit to stand: Supervision;Stand by assistance  Stand to sit: Supervision;Stand by assistance     Cognition  Overall Cognitive Status: WFL        Sensation  Overall Sensation Status: WFL  Type of ROM/Therapeutic Exercise  Type of ROM/Therapeutic Exercise: AROM  Comment: B UE exercises; red theraband; seated  Exercises  Shoulder Flexion: x10 reps  Shoulder Extension: x10 reps  Shoulder ABduction: x10 reps  Shoulder ADduction: x10 reps  Horizontal ABduction: x10 reps  Horizontal ADduction: x10 reps  Elbow Flexion: x10 reps  Elbow Extension: x10 reps  Other: x10 reps chest press     LUE AROM (degrees)  LUE AROM : WFL  RUE AROM (degrees)  RUE AROM : WFL  LUE Strength  Gross LUE Strength: WFL  L Hand General: 5/5  RUE Strength  Gross RUE Strength: WFL  R Hand General: 5/5     Car Transfers  Car - Technique: Ambulating  Car Transfers: Supervision;Stand by assistance             Plan   Plan  Times per week: 5x  Current Treatment Recommendations: Strengthening, Balance Training, Safety Education & Training, Self-Care / ADL, Equipment Evaluation, Education, & procurement, Patient/Caregiver Education & Training, Gait Training, Functional Mobility Training, Endurance Training      AM-PAC Score        AM-PAC Inpatient Daily Activity Raw Score: 19 (06/19/21 0954)  AM-PAC Inpatient ADL T-Scale Score : 40.22 (06/19/21 0954)  ADL Inpatient CMS 0-100% Score: 42.8 (06/19/21 0954)  ADL Inpatient CMS G-Code Modifier : CK (06/19/21 0954)    Goals  Short term goals  Time Frame for Short term goals: 5 days  Short term goal 1: complete functional mobility and transfers Ind  Short term goal 2: complete bathing and dressing Ind  Short term goal 3: complete toileting Ind  Short term goal 4: complete grooming in stance at sink Ind  Short term goal 5: Pt will complete simple meal prep Ind  Patient Goals   Patient goals : return home       Therapy Time   Individual Concurrent Group Co-treatment   Time In 0800         Time Out 0930         Minutes 90         Timed Code Treatment Minutes: 75 Minutes (15 minute eval)       Tre Edwards, OTR/L

## 2021-06-19 NOTE — PROGRESS NOTES
Cholecystectomy. Restrictions  Restrictions/Precautions  Restrictions/Precautions: Fall Risk     Vision/Hearing: WFL        Subjective  General  Chart Reviewed: Yes  Patient assessed for rehabilitation services?: Yes  Additional Pertinent Hx: Per Dr. Yolanda Gordonlding is a 75 yo F with pmh DM2 and hypothyroidism who intitially presented to [facility] on 6/12/2021 with dark diarrhea, n/v, and progressive weakness. She did have a fall prior to presenting to the ED, fell backwards and did hit the back of her head. Found to have upper GI bleed. EGD revealed ulcer at GE junction, minimal gastric erosion in the antrum. She was started on ppi and Venofer. Of note, patient has been taking NSAIDs daily for 3 months due to back pain related to T12 compression fracture. Course was complicated by SILVESTRE, NSTEMI, DD2 on TTE. 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 Now presents to ARU with impaired mobility and self-care below her baseline. Currently, patient reports generalized weakness and fatigue. She has mild back pain. She denies abdominal pain, n/v, blood in stool. She is motivated to start inpatient rehab program.\"  Response To Previous Treatment: Not applicable  Referring Practitioner: Dr. Scooby Giles  Referral Date : 06/18/21  Diagnosis: SILVESTRE; Anemia; UGI bleed; NSTEMI  Follows Commands: Within Functional Limits  Subjective  Subjective: Pt pleasant and agreeable to evaluation.   Denies back pain at rest.    Orientation  Orientation  Overall Orientation Status: Within Normal Limits     Social/Functional History  Social/Functional History  Lives With: Spouse  Type of Home: House  Home Layout: One level  Home Access: Stairs to enter without rails  Entrance Stairs - Number of Steps: 2-3 through garage; 5 steps through front/back  Bathroom Shower/Tub: Walk-in shower  Bathroom Toilet: Handicap height  ADL Assistance: [facility]: Independent  Homemaking Responsibilities: Yes  Ambulation Assistance: Independent  Transfer Assistance: Independent  Active : Yes  Occupation: Retired  Type of occupation: small [employer]  Leisure & Hobbies: golf  Additional Comments: Pt reports 2 falls in past 6 months    Objective    AROM RLE (degrees)  RLE AROM: WNL  RLE General AROM: Hip Flex, ABD, knee Flex/Ext, and Ankle PF/DF WNL  AROM LLE (degrees)  LLE AROM : WNL  LLE General AROM: Hip Flex, ABD, knee Flex/Ext, and Ankle PF/DF WNL  AROM RUE (degrees)  RUE AROM : WNL  RUE General AROM: Shoulder Flex, ABD, Elbow Flex/Ext WNL  AROM LUE (degrees)  LUE AROM : WNL  LUE General AROM: Shoulder Flex, ABD, Elbow Flex/Ext WNL     Strength RLE  Strength RLE: WNL  Comment: hip Flex 5/5, ABD 4/5, Knee Flex/Ext 5/5, Ankle DF 5/5, PF 4/5  Strength LLE  Strength LLE: WNL  Comment: hip Flex 5/5, ABD 4/5, Knee Flex/Ext 5/5, Ankle DF 5/5, PF 4/5  Strength RUE  Strength RUE: WNL  Comment: Shoulder Flex, ABD, Elbow Flex/ext grossly 5/5  Strength LUE  Strength LUE: WNL  Comment: Shoulder Flex, ABD, Elbow Flex/ext grossly 5/5     Tone RLE  RLE Tone: Normotonic  Tone LLE  LLE Tone: Normotonic  Motor Control  Gross Motor?: WNL     Sensation  Overall Sensation Status: WNL (Able to localize sensation to light touch in (B) hands/fingers, feet/toes, heels, knees)     Bed mobility  Rolling to Left: Supervision  Rolling to Right: Supervision  Supine to Sit: Supervision  Sit to Supine: Supervision     Transfers  Sit to Stand: Supervision  Stand to sit: Supervision  Bed to Chair: Supervision  Car Transfer: Supervision     Ambulation  Ambulation?: Yes  Ambulation 1  Surface: level tile  Device: No Device  Assistance: Supervision  Quality of Gait: Fast pace, step-through pattern, mild inermittent sway/veer to R (reports this is chronic), no overt LOB. Distance: 650'    Ambulation: Pt able to ascend/descend 15' ramp x 2 trials without UE support, supervision, no LOB.      Stairs/Curb  Stairs?: Yes  Stairs  # Steps : 12  Stairs Height: 6\"  Rails: Bilateral  Curbs: 6\"  Assistance: Supervision;Stand by assistance  Comment: Pt able to ascend/descend 12 steps with reciprocal pattern, no LOB, no c/o fatigue, supervision. Pt able to ascend/descend curb step with CGA-SBA, no device, mild lean to R. Other exercises  Other exercises?: Yes  Other exercises 1: TrA isometric x 10. TrA isometric with abbreviated curl-up x 10. Other exercises 2: TrA isometric followed by abbreviated bridge x 10. Other exercises 3: Sit < > stand x 7 without UE support, x 3 with single hand support, limited d/t fatigue. Other exercises 4: Sustained squat 5 x 10 s. hold, cues for proper form, no LOB. Other exercises 5: Semi-tandem stance stance RLE leading EO 4 x 10 s., EC 4 x 10 s. Semi-tandem stance stance LLE leading EO 4 x 10 s., EC 4 x 10 s. Other: PT provided pt with written HEP for TrA isometric, bridge, and curl-up. Plan   Plan  Times per week: 5-6x  Plan weeks: 5 days  Specific instructions for Next Treatment: Tandem stance; single-leg stand; sit < > stand without UE support  Current Treatment Recommendations: Strengthening, Balance Training, Endurance Training, Functional Mobility Training, Transfer Training, Gait Training, Stair training, Positioning, Equipment Evaluation, Education, & procurement, Patient/Caregiver Education & Training, Safety Education & Training, Home Exercise Program, Neuromuscular Re-education  Safety Devices  Type of devices: All fall risk precautions in place, Gait belt, Left in chair, Nurse notified, Call light within reach (Pt cleared with nursing for ambulation in room with assist from )  Restraints  Initially in place: No    OutComes Score  Larson Balance Score: 51 (06/19/21 1118)    Goals  Short term goals  Time Frame for Short term goals:  In 5 days pt will perform  Short term goal 1: Bed mobility (I)  Short term goal 2: Transfers (I)  Short term goal 3: Ambulation 500' (I) without device  Short term goal 4: Ascend/Descend 12 steps with single HR support, mod I, reciprocal pattern  Short term goal 5: Ascend/Descend curb step independently without device  Long term goals  Time Frame for Long term goals : LTG = STG  Patient Goals   Patient goals : To improve her balance and core strength       Therapy Time   Individual Concurrent Group Co-treatment   Time In 1030         Time Out 1200         Minutes 90         Timed Code Treatment Minutes: 90 Minutes       Mook Newman, PT    Electronically signed by Mook Newman, PT [id number] on 6/19/2021 at 12:15 PM          Larson Balance Scale    SITTING TO STANDING    INSTRUCTIONS: Please stand up. Try not to use your hand for support. ( ) 4 able to stand without using hands and stabilize independently  (x) 3 able to stand independently using hands  ( ) 2 able to stand using hands after several tries  ( ) 1 needs minimal aid to stand or stabilize  ( ) 0 needs moderate or maximal assist to stand    STANDING UNSUPPORTED    INSTRUCTIONS: Please stand for two minutes without holding on. (x) 4 able to stand safely for 2 minutes  ( ) 3 able to stand 2 minutes with supervision  ( ) 2 able to stand 30 seconds unsupported  ( ) 1 needs several tries to stand 30 seconds unsupported  ( ) 0 unable to stand 30 seconds unsupported    If a subject is able to stand 2 minutes unsupported, score full points for sitting unsupported. Proceed to item #4. SITTING WITH BACK UNSUPPORTED BUT FEET SUPPORTED ON FLOOR OR ON A STOOL    INSTRUCTIONS: Please sit with arms folded for 2 minutes. (x) 4 able to sit safely and securely for 2 minutes  ( ) 3 able to sit 2 minutes under supervision  ( ) 2 able to able to sit 30 seconds  ( ) 1 able to sit 10 seconds  ( ) 0 unable to sit without support 10 seconds    STANDING TO SITTING    INSTRUCTIONS: Please sit down.     (x) 4 sits safely with minimal use of hands  ( ) 3 controls descent by using hands  ( ) 2 uses back of legs against chair to control descent  ( ) 1 sits independently but has uncontrolled descent  ( ) 0 needs assist to sit    TRANSFERS    INSTRUCTIONS: Arrange chair(s) for pivot transfer. Ask subject to transfer one way   toward a seat with armrests and one way  toward a seat without armrests. You may use two chairs (one with and one without armrests) or a bed and a chair. ( ) 4 able to transfer safely with minor use of hands  (x) 3 able to transfer safely definite need of hands  ( ) 2 able to transfer with verbal cuing and/or supervision  ( ) 1 needs one person to assist  ( ) 0 needs two people to assist or supervise to be safe    STANDING UNSUPPORTED WITH EYES CLOSED    INSTRUCTIONS: Please close your eyes and stand still for 10 seconds. (x) 4 able to stand 10 seconds safely  ( ) 3 able to stand 10 seconds with supervision  ( ) 2 able to stand 3 seconds  ( ) 1 unable to keep eyes closed 3 seconds but stays safely  ( ) 0 needs help to keep from falling    STANDING UNSUPPORTED WITH FEET TOGETHER    INSTRUCTIONS: Place your feet together and stand without holding on. (x) 4 able to place feet together independently and stand 1 minute safely  ( ) 3 able to place feet together independently and stand 1 minute with supervision  ( ) 2 able to place feet together independently but unable to hold for 30 seconds  ( ) 1 needs help to attain position but able to stand 15 seconds feet together  ( ) 0 needs help to attain position and unable to hold for 15 seconds    REACHING FORWARD WITH OUTSTRETCHED ARM WHILE STANDING    INSTRUCTIONS: Lift arm to 90 degrees. Stretch out your fingers and reach forward as far as you can. (Examiner places a ruler at  the end of fingertips when arm is at 90 degrees. Fingers should not touch the ruler while reaching forward. The recorded measure is  the distance forward that the fingers reach while the subject is in the most forward lean position.  When possible, ask subject to use  both arms when reaching to avoid rotation of the trunk.)    (x) 4 can reach forward confidently 25 cm (10 inches)  ( ) 3 can reach forward 12 cm (5 inches)  ( ) 2 can reach forward 5 cm (2 inches)  ( ) 1 reaches forward but needs supervision  ( ) 0 loses balance while trying/requires external support     OBJECT FROM THE FLOOR FROM A STANDING POSITION    INSTRUCTIONS:  the shoe/slipper, which is in front of your feet. (x) 4 able to  slipper safely and easily  ( ) 3 able to  slipper but needs supervision  ( ) 2 unable to  but reaches 2-5 cm(1-2 inches) from slipper and keeps balance independently  ( ) 1 unable to  and needs supervision while trying  ( ) 0 unable to try/needs assist to keep from losing balance or falling    TURNING TO LOOK BEHIND OVER LEFT AND RIGHT SHOULDERS WHILE STANDING    INSTRUCTIONS: Turn to look directly behind you over toward the left shoulder. Repeat to the right. (Examiner may pick an object  to look at directly behind the subject to encourage a better twist turn.)    (x) 4 looks behind from both sides and weight shifts well  ( ) 3 looks behind one side only other side shows less weight shift  ( ) 2 turns sideways only but maintains balance  ( ) 1 needs supervision when turning  ( ) 0 needs assist to keep from losing balance or falling    TURN [address]: Turn completely around in a full [place]. Pause. Then turn a full [place] in the other direction. (x) 4 able to turn 360 degrees safely in 4 seconds or less  ( ) 3 able to turn 360 degrees safely one side only 4 seconds or less  ( ) 2 able to turn 360 degrees safely but slowly  ( ) 1 needs close supervision or verbal cuing  ( ) 0 needs assistance while turning    PLACE ALTERNATE FOOT ON STEP OR STOOL WHILE STANDING UNSUPPORTED    INSTRUCTIONS: Place each foot alternately on the step/stool. Continue until each foot has touched the step/stool four times.     (x) 4 able to stand independently and safely and complete 8 steps in 20 seconds  ( ) 3 able to stand independently and complete 8 steps in > 20 seconds  ( ) 2 able to complete 4 steps without aid with supervision  ( ) 1 able to complete > 2 steps needs minimal assist  ( ) 0 needs assistance to keep from falling/unable to try    STANDING UNSUPPORTED ONE FOOT IN FRONT    INSTRUCTIONS: (DEMONSTRATE TO SUBJECT) Place one foot directly in front of the other. If you feel that you cannot place  your foot directly in front, try to step far enough ahead that the heel of your forward foot is ahead of the toes of the other foot. (To  score 3 points, the length of the step should exceed the length of the other foot and the width of the stance should approximate the  subjects normal stride width.)  ( ) 4 able to place foot tandem independently and hold 30 seconds  (x) 3 able to place foot ahead independently and hold 30 seconds  ( ) 2 able to take small step independently and hold 30 seconds  ( ) 1 needs help to step but can hold 15 seconds  ( ) 0 loses balance while stepping or standing    STANDING ON ONE LEG    INSTRUCTIONS: Stand on one leg as long as you can without holding on. ( ) 4 able to lift leg independently and hold > 10 seconds  ( ) 3 able to lift leg independently and hold 5-10 seconds  (x) 2 able to lift leg independently and hold L 3 seconds  ( ) 1 tries to lift leg unable to hold 3 seconds but remains standing independently. ( ) 0 unable to try of needs assist to prevent fall    (51) TOTAL SCORE (Maximum = 56)    Interpretation    41-56 = low fall risk  21-40 = medium fall risk  0 -20 = high fall risk    A change of 8 points is required to reveal a genuine change in function between 2 assessments.

## 2021-06-20 LAB
ANION GAP SERPL CALCULATED.3IONS-SCNC: 12 MMOL/L (ref 3–16)
BASOPHILS ABSOLUTE: 0.1 K/UL (ref 0–0.2)
BASOPHILS RELATIVE PERCENT: 0.4 %
BUN BLDV-MCNC: 45 MG/DL (ref 7–20)
CALCIUM SERPL-MCNC: 7.9 MG/DL (ref 8.3–10.6)
CHLORIDE BLD-SCNC: 108 MMOL/L (ref 99–110)
CO2: 20 MMOL/L (ref 21–32)
CREAT SERPL-MCNC: 1.8 MG/DL (ref 0.6–1.2)
EOSINOPHILS ABSOLUTE: 0.2 K/UL (ref 0–0.6)
EOSINOPHILS RELATIVE PERCENT: 1.5 %
GFR AFRICAN AMERICAN: 34
GFR NON-AFRICAN AMERICAN: 28
GLUCOSE BLD-MCNC: 111 MG/DL (ref 70–99)
GLUCOSE BLD-MCNC: 124 MG/DL (ref 70–99)
GLUCOSE BLD-MCNC: 125 MG/DL (ref 70–99)
GLUCOSE BLD-MCNC: 161 MG/DL (ref 70–99)
GLUCOSE BLD-MCNC: 82 MG/DL (ref 70–99)
HCT VFR BLD CALC: 20.5 % (ref 36–48)
HEMOGLOBIN: 7.2 G/DL (ref 12–16)
LYMPHOCYTES ABSOLUTE: 1.5 K/UL (ref 1–5.1)
LYMPHOCYTES RELATIVE PERCENT: 9.4 %
MAGNESIUM: 1.8 MG/DL (ref 1.8–2.4)
MCH RBC QN AUTO: 30.2 PG (ref 26–34)
MCHC RBC AUTO-ENTMCNC: 35.1 G/DL (ref 31–36)
MCV RBC AUTO: 86.1 FL (ref 80–100)
MONOCYTES ABSOLUTE: 1 K/UL (ref 0–1.3)
MONOCYTES RELATIVE PERCENT: 5.9 %
NEUTROPHILS ABSOLUTE: 13.5 K/UL (ref 1.7–7.7)
NEUTROPHILS RELATIVE PERCENT: 82.8 %
PDW BLD-RTO: 14.7 % (ref 12.4–15.4)
PERFORMED ON: ABNORMAL
PERFORMED ON: NORMAL
PLATELET # BLD: 450 K/UL (ref 135–450)
PMV BLD AUTO: 6.3 FL (ref 5–10.5)
POTASSIUM SERPL-SCNC: 3.5 MMOL/L (ref 3.5–5.1)
RBC # BLD: 2.39 M/UL (ref 4–5.2)
SODIUM BLD-SCNC: 140 MMOL/L (ref 136–145)
URINE CULTURE, ROUTINE: NORMAL
WBC # BLD: 16.3 K/UL (ref 4–11)

## 2021-06-20 PROCEDURE — 94760 N-INVAS EAR/PLS OXIMETRY 1: CPT

## 2021-06-20 PROCEDURE — 80048 BASIC METABOLIC PNL TOTAL CA: CPT

## 2021-06-20 PROCEDURE — 1280000000 HC REHAB R&B

## 2021-06-20 PROCEDURE — 6370000000 HC RX 637 (ALT 250 FOR IP): Performed by: PHYSICAL MEDICINE & REHABILITATION

## 2021-06-20 PROCEDURE — 36415 COLL VENOUS BLD VENIPUNCTURE: CPT

## 2021-06-20 PROCEDURE — 85025 COMPLETE CBC W/AUTO DIFF WBC: CPT

## 2021-06-20 PROCEDURE — 83735 ASSAY OF MAGNESIUM: CPT

## 2021-06-20 RX ADMIN — INSULIN LISPRO 5 UNITS: 100 INJECTION, SOLUTION INTRAVENOUS; SUBCUTANEOUS at 16:42

## 2021-06-20 RX ADMIN — CEFDINIR 300 MG: 300 CAPSULE ORAL at 21:52

## 2021-06-20 RX ADMIN — INSULIN LISPRO 5 UNITS: 100 INJECTION, SOLUTION INTRAVENOUS; SUBCUTANEOUS at 07:45

## 2021-06-20 RX ADMIN — HYDRALAZINE HYDROCHLORIDE 25 MG: 25 TABLET, FILM COATED ORAL at 05:19

## 2021-06-20 RX ADMIN — ATORVASTATIN CALCIUM 40 MG: 40 TABLET, FILM COATED ORAL at 21:51

## 2021-06-20 RX ADMIN — METOPROLOL TARTRATE 25 MG: 25 TABLET, FILM COATED ORAL at 07:45

## 2021-06-20 RX ADMIN — LEVOTHYROXINE SODIUM 125 MCG: 0.12 TABLET ORAL at 05:19

## 2021-06-20 RX ADMIN — PANTOPRAZOLE SODIUM 40 MG: 40 TABLET, DELAYED RELEASE ORAL at 05:19

## 2021-06-20 RX ADMIN — CEFDINIR 300 MG: 300 CAPSULE ORAL at 07:45

## 2021-06-20 RX ADMIN — METOPROLOL TARTRATE 25 MG: 25 TABLET, FILM COATED ORAL at 21:52

## 2021-06-20 RX ADMIN — INSULIN LISPRO 5 UNITS: 100 INJECTION, SOLUTION INTRAVENOUS; SUBCUTANEOUS at 11:43

## 2021-06-20 RX ADMIN — NIFEDIPINE 60 MG: 60 TABLET, EXTENDED RELEASE ORAL at 07:45

## 2021-06-20 RX ADMIN — INSULIN GLARGINE 15 UNITS: 100 INJECTION, SOLUTION SUBCUTANEOUS at 21:51

## 2021-06-20 RX ADMIN — INSULIN LISPRO 1 UNITS: 100 INJECTION, SOLUTION INTRAVENOUS; SUBCUTANEOUS at 11:43

## 2021-06-20 ASSESSMENT — PAIN SCALES - GENERAL: PAINLEVEL_OUTOF10: 0

## 2021-06-20 NOTE — PROGRESS NOTES
Patient admitted to rehab with Debility s/p UTI. A/A/O x 4. Transfers with SBA w/ gait belt. On Carb control diet, tolerating well. Medications taken whole. On Lovenox for DVT prophylaxis. Skin intact. On room air. Has been continent of bowel and bladder. LBM 6/20 liquid stools x2. Chair/bed alarms in use and call light in reach. Will monitor for safety.

## 2021-06-20 NOTE — PLAN OF CARE
Problem: Infection:  Goal: Will remain free from infection  Description: Will remain free from infection  Outcome: Ongoing     Problem: Safety:  Goal: Free from accidental physical injury  Description: Free from accidental physical injury  Outcome: Ongoing     Problem: Daily Care:  Goal: Daily care needs are met  Description: Daily care needs are met  Outcome: Ongoing     Problem: Pain:  Goal: Patient's pain/discomfort is manageable  Description: Patient's pain/discomfort is manageable  Outcome: Ongoing

## 2021-06-21 LAB
ANION GAP SERPL CALCULATED.3IONS-SCNC: 11 MMOL/L (ref 3–16)
BASOPHILS ABSOLUTE: 0.1 K/UL (ref 0–0.2)
BASOPHILS RELATIVE PERCENT: 0.4 %
BILIRUBIN URINE: NEGATIVE
BLOOD, URINE: ABNORMAL
BUN BLDV-MCNC: 37 MG/DL (ref 7–20)
CALCIUM SERPL-MCNC: 7.8 MG/DL (ref 8.3–10.6)
CHLORIDE BLD-SCNC: 108 MMOL/L (ref 99–110)
CLARITY: ABNORMAL
CO2: 22 MMOL/L (ref 21–32)
COLOR: YELLOW
CREAT SERPL-MCNC: 1.9 MG/DL (ref 0.6–1.2)
EOSINOPHILS ABSOLUTE: 0.3 K/UL (ref 0–0.6)
EOSINOPHILS RELATIVE PERCENT: 1.6 %
EPITHELIAL CELLS, UA: 2 /HPF (ref 0–5)
GFR AFRICAN AMERICAN: 32
GFR NON-AFRICAN AMERICAN: 26
GLUCOSE BLD-MCNC: 120 MG/DL (ref 70–99)
GLUCOSE BLD-MCNC: 222 MG/DL (ref 70–99)
GLUCOSE BLD-MCNC: 235 MG/DL (ref 70–99)
GLUCOSE BLD-MCNC: 56 MG/DL (ref 70–99)
GLUCOSE BLD-MCNC: 66 MG/DL (ref 70–99)
GLUCOSE BLD-MCNC: 85 MG/DL (ref 70–99)
GLUCOSE URINE: 100 MG/DL
HCT VFR BLD CALC: 21.2 % (ref 36–48)
HEMOGLOBIN: 7.3 G/DL (ref 12–16)
HYALINE CASTS: 5 /LPF (ref 0–8)
KETONES, URINE: NEGATIVE MG/DL
LEUKOCYTE ESTERASE, URINE: ABNORMAL
LYMPHOCYTES ABSOLUTE: 1.8 K/UL (ref 1–5.1)
LYMPHOCYTES RELATIVE PERCENT: 10.8 %
MAGNESIUM: 1.8 MG/DL (ref 1.8–2.4)
MCH RBC QN AUTO: 29.8 PG (ref 26–34)
MCHC RBC AUTO-ENTMCNC: 34.2 G/DL (ref 31–36)
MCV RBC AUTO: 86.9 FL (ref 80–100)
MICROSCOPIC EXAMINATION: YES
MONOCYTES ABSOLUTE: 0.8 K/UL (ref 0–1.3)
MONOCYTES RELATIVE PERCENT: 4.9 %
NEUTROPHILS ABSOLUTE: 13.6 K/UL (ref 1.7–7.7)
NEUTROPHILS RELATIVE PERCENT: 82.3 %
NITRITE, URINE: NEGATIVE
PDW BLD-RTO: 14.9 % (ref 12.4–15.4)
PERFORMED ON: ABNORMAL
PERFORMED ON: NORMAL
PH UA: 5.5 (ref 5–8)
PLATELET # BLD: 476 K/UL (ref 135–450)
PMV BLD AUTO: 6.6 FL (ref 5–10.5)
POTASSIUM SERPL-SCNC: 4 MMOL/L (ref 3.5–5.1)
PROTEIN UA: >=300 MG/DL
RBC # BLD: 2.44 M/UL (ref 4–5.2)
RBC UA: 5 /HPF (ref 0–4)
SODIUM BLD-SCNC: 141 MMOL/L (ref 136–145)
SPECIFIC GRAVITY UA: 1.01 (ref 1–1.03)
URINE REFLEX TO CULTURE: YES
URINE TYPE: ABNORMAL
UROBILINOGEN, URINE: 0.2 E.U./DL
WBC # BLD: 16.5 K/UL (ref 4–11)
WBC UA: 70 /HPF (ref 0–5)

## 2021-06-21 PROCEDURE — 97116 GAIT TRAINING THERAPY: CPT

## 2021-06-21 PROCEDURE — 36415 COLL VENOUS BLD VENIPUNCTURE: CPT

## 2021-06-21 PROCEDURE — 87077 CULTURE AEROBIC IDENTIFY: CPT

## 2021-06-21 PROCEDURE — 87086 URINE CULTURE/COLONY COUNT: CPT

## 2021-06-21 PROCEDURE — 1280000000 HC REHAB R&B

## 2021-06-21 PROCEDURE — 97530 THERAPEUTIC ACTIVITIES: CPT

## 2021-06-21 PROCEDURE — 97110 THERAPEUTIC EXERCISES: CPT

## 2021-06-21 PROCEDURE — 6370000000 HC RX 637 (ALT 250 FOR IP): Performed by: PHYSICAL MEDICINE & REHABILITATION

## 2021-06-21 PROCEDURE — 87186 SC STD MICRODIL/AGAR DIL: CPT

## 2021-06-21 PROCEDURE — 81001 URINALYSIS AUTO W/SCOPE: CPT

## 2021-06-21 PROCEDURE — 94760 N-INVAS EAR/PLS OXIMETRY 1: CPT

## 2021-06-21 PROCEDURE — 83735 ASSAY OF MAGNESIUM: CPT

## 2021-06-21 PROCEDURE — 85025 COMPLETE CBC W/AUTO DIFF WBC: CPT

## 2021-06-21 PROCEDURE — 80048 BASIC METABOLIC PNL TOTAL CA: CPT

## 2021-06-21 RX ORDER — INSULIN GLARGINE 100 [IU]/ML
10 INJECTION, SOLUTION SUBCUTANEOUS NIGHTLY
Status: DISCONTINUED | OUTPATIENT
Start: 2021-06-21 | End: 2021-06-22

## 2021-06-21 RX ORDER — NIFEDIPINE 90 MG/1
90 TABLET, EXTENDED RELEASE ORAL DAILY
Status: DISCONTINUED | OUTPATIENT
Start: 2021-06-22 | End: 2021-06-28 | Stop reason: HOSPADM

## 2021-06-21 RX ORDER — METOPROLOL TARTRATE 50 MG/1
50 TABLET, FILM COATED ORAL 2 TIMES DAILY
COMMUNITY
Start: 2021-06-18

## 2021-06-21 RX ORDER — LEVOFLOXACIN 250 MG/1
250 TABLET ORAL DAILY
Status: DISCONTINUED | OUTPATIENT
Start: 2021-06-21 | End: 2021-06-23

## 2021-06-21 RX ADMIN — ATORVASTATIN CALCIUM 40 MG: 40 TABLET, FILM COATED ORAL at 20:53

## 2021-06-21 RX ADMIN — INSULIN GLARGINE 10 UNITS: 100 INJECTION, SOLUTION SUBCUTANEOUS at 20:53

## 2021-06-21 RX ADMIN — PANTOPRAZOLE SODIUM 40 MG: 40 TABLET, DELAYED RELEASE ORAL at 06:22

## 2021-06-21 RX ADMIN — DOCUSATE SODIUM 50 MG AND SENNOSIDES 8.6 MG 1 TABLET: 8.6; 5 TABLET, FILM COATED ORAL at 08:31

## 2021-06-21 RX ADMIN — LEVOFLOXACIN 250 MG: 250 TABLET, FILM COATED ORAL at 16:57

## 2021-06-21 RX ADMIN — NIFEDIPINE 60 MG: 60 TABLET, EXTENDED RELEASE ORAL at 08:31

## 2021-06-21 RX ADMIN — METOPROLOL TARTRATE 25 MG: 25 TABLET, FILM COATED ORAL at 20:53

## 2021-06-21 RX ADMIN — INSULIN LISPRO 2 UNITS: 100 INJECTION, SOLUTION INTRAVENOUS; SUBCUTANEOUS at 12:27

## 2021-06-21 RX ADMIN — LEVOTHYROXINE SODIUM 125 MCG: 0.12 TABLET ORAL at 06:22

## 2021-06-21 RX ADMIN — CEFDINIR 300 MG: 300 CAPSULE ORAL at 08:31

## 2021-06-21 RX ADMIN — INSULIN LISPRO 2 UNITS: 100 INJECTION, SOLUTION INTRAVENOUS; SUBCUTANEOUS at 16:57

## 2021-06-21 RX ADMIN — DOCUSATE SODIUM 50 MG AND SENNOSIDES 8.6 MG 1 TABLET: 8.6; 5 TABLET, FILM COATED ORAL at 20:53

## 2021-06-21 RX ADMIN — INSULIN LISPRO 5 UNITS: 100 INJECTION, SOLUTION INTRAVENOUS; SUBCUTANEOUS at 12:27

## 2021-06-21 RX ADMIN — METOPROLOL TARTRATE 25 MG: 25 TABLET, FILM COATED ORAL at 08:31

## 2021-06-21 RX ADMIN — INSULIN LISPRO 5 UNITS: 100 INJECTION, SOLUTION INTRAVENOUS; SUBCUTANEOUS at 16:57

## 2021-06-21 ASSESSMENT — PAIN SCALES - GENERAL: PAINLEVEL_OUTOF10: 0

## 2021-06-21 NOTE — PROGRESS NOTES
Occupational Therapy  Facility[facility]Department[facility]  Daily Treatment Note  NAME: Kimmie Newman  : 1955  MRN: [id number]    Date of Service: 2021    Discharge Recommendations:  Continue to assess pending progress, Home with assist PRN  OT Equipment Recommendations  Other: possible shower chair; continue to assess    Assessment   Performance deficits / Impairments: Decreased functional mobility ; Decreased endurance;Decreased ADL status; Decreased high-level IADLs;Decreased balance  Assessment: Discussed with OTR: Pt tolerated session well, completing IADL task, standing for 10.5 min, withSBA and very min cues for safety. Pt amb/transfers without device, SBA/Supervision. Pt completing BUe ex's to increase strength/endurance for functional tasks. Cont poc to maximize function for return home. Prognosis: Good  REQUIRES OT FOLLOW UP: Yes  Activity Tolerance  Activity Tolerance: Patient Tolerated treatment well  Safety Devices  Safety Devices in place: Yes  Type of devices: Gait belt         Patient Diagnosis(es): There were no encounter diagnoses. has a past medical history of CAD (coronary artery disease), Diabetes mellitus ([facility] [address] Hyperlipidemia, Shingles, and Thyroid disease. has a past surgical history that includes eye surgery; sinus surgery; and Cholecystectomy. Restrictions  Restrictions/Precautions  Restrictions/Precautions: Fall Risk  Subjective   General  Chart Reviewed: Yes, Progress Notes  Patient assessed for rehabilitation services?: Yes  Additional Pertinent Hx: Patient is a 73 yo F with pmh DM2 and hypothyroidism who intitially presented to [facility] on 2021 with dark diarrhea, n/v, and progressive weakness. She did have a fall prior to presenting to the ED, fell backwards and did hit the back of her head. Found to have upper GI bleed. EGD revealed ulcer at GE junction, minimal gastric erosion in the antrum. She was started on ppi and Venofer.  Of note, patient has been taking NSAIDs daily for 3 months due to back pain related to T12 compression fracture. Course was complicated by SILVESTRE, NSTEMI, DD2 on TTE. 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 Now presents to ARU with impaired mobility and self-care below her baseline. Currently, patient reports generalized weakness and fatigue. She has mild back pain. She denies abdominal pain, n/v, blood in stool. She is motivated to start inpatient rehab program.  Family / Caregiver Present: No  Referring Practitioner: Juan Daniel Coelho MD  Diagnosis: Upper GI Bleed  Subjective  Subjective: Pt met in dept. Pt agreeable to therapy and denied pain. Orientation  Orientation  Overall Orientation Status: Within Functional Limits  Objective       Instrumental ADL's  Instrumental ADLs: Yes  Meal Prep  Meal Prep Level:  (no device)  Meal Prep Level of Assistance: Stand by assistance  Meal Preparation: Pt prepared egg on stove. Pt with good tolerance for standing, no c/o dizziness, pain. Pt cleaned area with SBA. 1 cue for her to notice if there was anything she forget to do, and pt noticed she had not turned off burner to stove.      Standing Balance  Time: 10.5 min  Activity: IADL  Functional Mobility  Functional - Mobility Device: No device  Activity: Retrieve items;Transport items  Assist Level: Stand by assistance  Functional Mobility Comments: for I ADL  Toilet Transfers  Toilet - Technique: Ambulating  Equipment Used: Grab bars (used R rail only, as sink at home)  Toilet Transfer: Stand by [facility] Transfers Comments: no device  Wheelchair Bed Transfers  Wheelchair/Bed - Technique: Ambulating  Equipment Used: Wheelchair (arm chairs)  Level of Asssistance: Stand by [facility] Transfers Comments: no device            Cognition  Overall Cognitive Status: WFL         Type of ROM/Therapeutic Exercise  Type of ROM/Therapeutic Exercise: Resistive Bands  Comment: UBE arm Kimberly ABURTO/SILVERIO,[phone]

## 2021-06-21 NOTE — PROGRESS NOTES
Physical Therapy  Facility/Department: [address] REHAB  Daily Treatment Note  NAME: Doneta Hamman  : 1955  MRN: [id number]    Date of Service: 2021    Discharge Recommendations:  Home with assist PRN, Outpatient PT, Patient would benefit from continued therapy after discharge   PT Equipment Recommendations  Equipment Needed: No    Assessment   Body structures, Functions, Activity limitations: Decreased functional mobility   Assessment: Pt is a 74 y.o. F. admitted  for SILVESTRE, Anemia; UGI bleed. She presents pleasant and agreeable to evaluation, demonstrating (B) UE/LE strength/ROM WNL, able to complete extended mobility in room, yousif, and gym without AD, supervision. She appears to be functioning at or very near her baseline for mobility tasks (reports hx of chronic, mild instability during ambulation), and would benefit from 5 days of therapy to improve her hip and core strength, and performance of high-level balance exercises. Anticipate return home with , prn assist, no home PT needed. May benefit from OP PT f/u pending progress. Specific instructions for Next Treatment: Tandem stance; single-leg stand; sit < > stand without UE support  REQUIRES PT FOLLOW UP: Yes     Patient Diagnosis(es): There were no encounter diagnoses. has a past medical history of CAD (coronary artery disease), Diabetes mellitus ([address] Hyperlipidemia, Shingles, and Thyroid disease. has a past surgical history that includes eye surgery; sinus surgery; and Cholecystectomy.     Restrictions  Restrictions/Precautions  Restrictions/Precautions: Fall Risk  Subjective       Objective      Transfers  Sit to Stand: Supervision  Stand to sit: Supervision  Bed to Chair: Supervision  Car Transfer: Supervision  Ambulation  Ambulation?: Yes  More Ambulation?: Yes  Ambulation 1  Surface: level tile  Device: No Device  Assistance: Supervision  Quality of Gait: Fast pace, step-through pattern, mild inermittent sway/veer to

## 2021-06-21 NOTE — PROGRESS NOTES
Pt awake and AAO lying in bed.  at bedside. Pt denies pain. Pt admitted to rehab after lumbar fx, GI bleed, and UTI. Here for strengthening. Lungs clear. No sob or cough. On RA. Belly round and soft with active BS. LBM today. Voids urine without difficulty. On Cefdinir for UTI. No edema noted. Call light in reach.  may tx pt. Will monitor.

## 2021-06-21 NOTE — PLAN OF CARE
Problem: Falls - Risk of:  Goal: Will remain free from falls  Description: Will remain free from falls  Outcome: Ongoing  Note: Pt s/p UTI, lumbar fracture and GI bleed. Pt can be unsteady on feet at times. Fall precautions in place. Alarms in place. Uses call light appropriately. No falls.

## 2021-06-21 NOTE — PLAN OF CARE
Problem: Falls - Risk of:  Goal: Will remain free from falls  Description: Will remain free from falls  6/21/2021 1002 by Blanche Mcknight RN  Outcome: Ongoing     Problem: Falls - Risk of:  Goal: Absence of physical injury  Description: Absence of physical injury  Outcome: Ongoing     Problem: Infection:  Goal: Will remain free from infection  Description: Will remain free from infection  Outcome: Ongoing     Problem: Safety:  Goal: Free from accidental physical injury  Description: Free from accidental physical injury  Outcome: Ongoing     Problem: Safety:  Goal: Free from intentional harm  Description: Free from intentional harm  Outcome: Ongoing     Problem: Daily Care:  Goal: Daily care needs are met  Description: Daily care needs are met  Outcome: Ongoing     Problem: Pain:  Goal: Patient's pain/discomfort is manageable  Description: Patient's pain/discomfort is manageable  Outcome: Ongoing     Problem: Skin Integrity:  Goal: Skin integrity will stabilize  Description: Skin integrity will stabilize  Outcome: Ongoing     Problem: Discharge Planning:  Goal: Patients continuum of care needs are met  Description: Patients continuum of care needs are met  Outcome: Ongoing     Problem: ABCDS Injury Assessment  Goal: Absence of physical injury  Outcome: Ongoing     Problem: Metabolic:  Goal: Ability to maintain appropriate glucose levels will improve  Description: Ability to maintain appropriate glucose levels will improve  Outcome: Ongoing

## 2021-06-21 NOTE — PROGRESS NOTES
Department of Physical Medicine & Rehabilitation  Progress Note    Patient Identification:  Yousif Piña  [id number]  : 1955  Admit date: 2021    Chief Complaint: Upper GI bleed    Subjective:   No acute events overnight. Patient seen this am sitting up in room. She reports feeling well, finding therapy helpful. We discussed results of infectious work up thus far. Labs reviewed. ROS: No f/c, n/v, cp     Objective:  Patient Vitals for the past 24 hrs:   BP Temp Temp src Pulse Resp SpO2 Weight   21 0943 -- -- -- -- -- 95 % --   21 0829 (!) 152/63 -- -- 82 -- -- --   21 0504 (!) 169/72 98.6 °F (37 °C) Oral 80 16 96 % 136 lb 3.9 oz (61.8 kg)   21 2152 (!) 164/72 -- -- 84 -- -- --   21 1714 118/69 97.5 °F (36.4 °C) Oral 81 17 99 % --     Const: Alert. No distress, pleasant. HEENT: Normocephalic, atraumatic. Normal sclera/conjunctiva. MMM. CV: Regular rate and rhythm. Resp: No respiratory distress. Lungs CTAB. Abd: Soft, nontender, nondistended, NABS+   Ext: +LE edema  Neuro: Alert, oriented, appropriately interactive. Psych: Cooperative, appropriate mood and affect    Laboratory data: Available via EMR.    Last 24 hour lab  Recent Results (from the past 24 hour(s))   POCT Glucose    Collection Time: 21 11:37 AM   Result Value Ref Range    POC Glucose 161 (H) 70 - 99 mg/dl    Performed on ACCU-CHEK    POCT Glucose    Collection Time: 21  4:19 PM   Result Value Ref Range    POC Glucose 82 70 - 99 mg/dl    Performed on ACCU-CHEK    POCT Glucose    Collection Time: 21  8:17 PM   Result Value Ref Range    POC Glucose 111 (H) 70 - 99 mg/dl    Performed on ACCU-CHEK    CBC Auto Differential    Collection Time: 21  6:53 AM   Result Value Ref Range    WBC 16.5 (H) 4.0 - 11.0 K/uL    RBC 2.44 (L) 4.00 - 5.20 M/uL    Hemoglobin 7.3 (L) 12.0 - 16.0 g/dL    Hematocrit 21.2 (L) 36.0 - 48.0 %    MCV 86.9 80.0 - 100.0 fL    MCH 29.8 26.0 - 34.0 pg MCHC 34.2 31.0 - 36.0 g/dL    RDW 14.9 12.4 - 15.4 %    Platelets 557 (H) 654 - 450 K/uL    MPV 6.6 5.0 - 10.5 fL    Neutrophils % 82.3 %    Lymphocytes % 10.8 %    Monocytes % 4.9 %    Eosinophils % 1.6 %    Basophils % 0.4 %    Neutrophils Absolute 13.6 (H) 1.7 - 7.7 K/uL    Lymphocytes Absolute 1.8 1.0 - 5.1 K/uL    Monocytes Absolute 0.8 0.0 - 1.3 K/uL    Eosinophils Absolute 0.3 0.0 - 0.6 K/uL    Basophils Absolute 0.1 0.0 - 0.2 K/uL   Basic Metabolic Panel    Collection Time: 06/21/21  6:53 AM   Result Value Ref Range    Sodium 141 136 - 145 mmol/L    Potassium 4.0 3.5 - 5.1 mmol/L    Chloride 108 99 - 110 mmol/L    CO2 22 21 - 32 mmol/L    Anion Gap 11 3 - 16    Glucose 56 (L) 70 - 99 mg/dL    BUN 37 (H) 7 - 20 mg/dL    CREATININE 1.9 (H) 0.6 - 1.2 mg/dL    GFR Non-African American 26 (A) >60    GFR  32 (A) >60    Calcium 7.8 (L) 8.3 - 10.6 mg/dL   Magnesium    Collection Time: 06/21/21  6:53 AM   Result Value Ref Range    Magnesium 1.80 1.80 - 2.40 mg/dL   POCT Glucose    Collection Time: 06/21/21  6:53 AM   Result Value Ref Range    POC Glucose 66 (L) 70 - 99 mg/dl    Performed on ACCU-CHEK    POCT Glucose    Collection Time: 06/21/21  7:09 AM   Result Value Ref Range    POC Glucose 85 70 - 99 mg/dl    Performed on ACCU-CHEK        Therapy progress:  PT     Objective     Sit to Stand: Supervision  Stand to sit: Supervision  Bed to Chair: Supervision  Device: No Device  Assistance: Supervision  Distance: 650'  OT  PT Equipment Recommendations  Equipment Needed: No  Toilet - Technique: Ambulating  Equipment Used: Grab bars (used R rail only, as sink at home)  Toilet Transfers Comments: no device  Assessment        SLP          Body mass index is 24.13 kg/m².     Rehabilitation Diagnosis:   16.0, Debility (non-cardiac, non-pulmonary        Assessment and Plan:     Impairments:  generalized weakness, decreased balance, endurance, spine ROM     Debility  -PT/OT     Upper GI bleed  -EGD with gastric ulcer, antral erosions  -NSAIDs dc'ed  -ppi     Acute blood loss anemia   -Due to GIB. -Monitor Hgb, transfuse prn <7.      SILVESTRE  -Possibly AIN  -Avoid nephrotoxins, renally dose meds  -Monitor renal function. Cr stable ~2     Leukocytosis with eosinophilia  -Per [facility] Michael, possibly due to AIN  -WBC increased on admission labs. Work-up initiated:  ---CXR negative, procalcitonin elevated  ---Blood cultures   ---Lactate wnl  ---UA+, culture contaminated. Started empiric cefdinir. Repeat Culture.   -Patient remains afebrile and asymptomatic.      Subacute T12 compression fracture  -Pain control with acetaminophen and lidoderm prn  -PT/OT     NSTEMI type II  -statin, bb     Diastolic dysfunction on TTE  -Daily wt     HTN  -metoprolol, nifedipine (increase dose)     DM2  -Lantus 10 qhs, prandial 5 TID, ISS     Hypothyroidism  -levothyroxine     Bladder   -High risk retention   -Monitor PVRs, SC prn >300cc     Bowel   -High risk constipation   -senna+colace BID, PRN miralax, MoM, and bisacodyl supp.     Safety   -fall precautions     PPx  -DVT: Hold due to bleed risk  -GI: pantoprazole       Rehab Progress: Making progress. Working on strength, endurance, balance. Anticipated Dispo: home with   Services/DME: TBD  ELOS: 5-7 days      [address]  Pancho Barron MD 6/21/2021, 10:56 AM

## 2021-06-21 NOTE — PROGRESS NOTES
Patient admitted to rehab with debility s/p UTI and upper GI bleed. A/A/O x 4. Transfers with SBA w/ gait belt,  approved by therapy to transfer. . On carb control diet, tolerating well. Medications taken whole. DVT prophylaxis on hold. Skin intact. On room air. Has been continent of bowel and bladder. LBM 6/20. Chair/bed alarms in use and call light in reach. Will monitor for safety.

## 2021-06-21 NOTE — PROGRESS NOTES
Medication Reconciliation    List of medications patient is currently taking is complete. Source of information: 1. Discharge medications from [facility]                                      2. EPIC records      Allergies  Lisinopril and Metformin and related     Notes regarding home medications:   1. Metoprolol succinate removed and updated to metoprolol tartrate.   2. Gabapentin and ibuprofen removed from list.

## 2021-06-22 LAB
ANION GAP SERPL CALCULATED.3IONS-SCNC: 11 MMOL/L (ref 3–16)
BASOPHILS ABSOLUTE: 0 K/UL (ref 0–0.2)
BASOPHILS RELATIVE PERCENT: 0.2 %
BUN BLDV-MCNC: 43 MG/DL (ref 7–20)
CALCIUM SERPL-MCNC: 8 MG/DL (ref 8.3–10.6)
CHLORIDE BLD-SCNC: 108 MMOL/L (ref 99–110)
CO2: 23 MMOL/L (ref 21–32)
CREAT SERPL-MCNC: 1.8 MG/DL (ref 0.6–1.2)
EOSINOPHILS ABSOLUTE: 0.2 K/UL (ref 0–0.6)
EOSINOPHILS RELATIVE PERCENT: 1.2 %
GFR AFRICAN AMERICAN: 34
GFR NON-AFRICAN AMERICAN: 28
GLUCOSE BLD-MCNC: 100 MG/DL (ref 70–99)
GLUCOSE BLD-MCNC: 123 MG/DL (ref 70–99)
GLUCOSE BLD-MCNC: 144 MG/DL (ref 70–99)
GLUCOSE BLD-MCNC: 84 MG/DL (ref 70–99)
GLUCOSE BLD-MCNC: 84 MG/DL (ref 70–99)
GLUCOSE BLD-MCNC: 95 MG/DL (ref 70–99)
HCT VFR BLD CALC: 23.1 % (ref 36–48)
HEMOGLOBIN: 7.7 G/DL (ref 12–16)
LYMPHOCYTES ABSOLUTE: 1.5 K/UL (ref 1–5.1)
LYMPHOCYTES RELATIVE PERCENT: 8.8 %
MAGNESIUM: 2 MG/DL (ref 1.8–2.4)
MCH RBC QN AUTO: 29.2 PG (ref 26–34)
MCHC RBC AUTO-ENTMCNC: 33.2 G/DL (ref 31–36)
MCV RBC AUTO: 88 FL (ref 80–100)
MONOCYTES ABSOLUTE: 0.8 K/UL (ref 0–1.3)
MONOCYTES RELATIVE PERCENT: 4.4 %
NEUTROPHILS ABSOLUTE: 14.9 K/UL (ref 1.7–7.7)
NEUTROPHILS RELATIVE PERCENT: 85.4 %
PDW BLD-RTO: 14.8 % (ref 12.4–15.4)
PERFORMED ON: ABNORMAL
PERFORMED ON: NORMAL
PERFORMED ON: NORMAL
PLATELET # BLD: 494 K/UL (ref 135–450)
PMV BLD AUTO: 6.6 FL (ref 5–10.5)
POTASSIUM SERPL-SCNC: 3.7 MMOL/L (ref 3.5–5.1)
RBC # BLD: 2.63 M/UL (ref 4–5.2)
SODIUM BLD-SCNC: 142 MMOL/L (ref 136–145)
WBC # BLD: 17.4 K/UL (ref 4–11)

## 2021-06-22 PROCEDURE — 97535 SELF CARE MNGMENT TRAINING: CPT

## 2021-06-22 PROCEDURE — 97530 THERAPEUTIC ACTIVITIES: CPT

## 2021-06-22 PROCEDURE — 97116 GAIT TRAINING THERAPY: CPT

## 2021-06-22 PROCEDURE — 83735 ASSAY OF MAGNESIUM: CPT

## 2021-06-22 PROCEDURE — 6370000000 HC RX 637 (ALT 250 FOR IP): Performed by: PHYSICAL MEDICINE & REHABILITATION

## 2021-06-22 PROCEDURE — 97110 THERAPEUTIC EXERCISES: CPT

## 2021-06-22 PROCEDURE — 80048 BASIC METABOLIC PNL TOTAL CA: CPT

## 2021-06-22 PROCEDURE — 85025 COMPLETE CBC W/AUTO DIFF WBC: CPT

## 2021-06-22 PROCEDURE — 36415 COLL VENOUS BLD VENIPUNCTURE: CPT

## 2021-06-22 PROCEDURE — 94761 N-INVAS EAR/PLS OXIMETRY MLT: CPT

## 2021-06-22 PROCEDURE — 1280000000 HC REHAB R&B

## 2021-06-22 RX ADMIN — ATORVASTATIN CALCIUM 40 MG: 40 TABLET, FILM COATED ORAL at 21:24

## 2021-06-22 RX ADMIN — HYDRALAZINE HYDROCHLORIDE 25 MG: 25 TABLET, FILM COATED ORAL at 12:19

## 2021-06-22 RX ADMIN — METOPROLOL TARTRATE 25 MG: 25 TABLET, FILM COATED ORAL at 08:36

## 2021-06-22 RX ADMIN — LEVOTHYROXINE SODIUM 125 MCG: 0.12 TABLET ORAL at 05:38

## 2021-06-22 RX ADMIN — INSULIN LISPRO 5 UNITS: 100 INJECTION, SOLUTION INTRAVENOUS; SUBCUTANEOUS at 08:36

## 2021-06-22 RX ADMIN — INSULIN LISPRO 5 UNITS: 100 INJECTION, SOLUTION INTRAVENOUS; SUBCUTANEOUS at 17:26

## 2021-06-22 RX ADMIN — PANTOPRAZOLE SODIUM 40 MG: 40 TABLET, DELAYED RELEASE ORAL at 05:38

## 2021-06-22 RX ADMIN — NIFEDIPINE 90 MG: 90 TABLET, FILM COATED, EXTENDED RELEASE ORAL at 08:35

## 2021-06-22 RX ADMIN — INSULIN LISPRO 5 UNITS: 100 INJECTION, SOLUTION INTRAVENOUS; SUBCUTANEOUS at 12:13

## 2021-06-22 RX ADMIN — METOPROLOL TARTRATE 25 MG: 25 TABLET, FILM COATED ORAL at 21:24

## 2021-06-22 RX ADMIN — LEVOFLOXACIN 250 MG: 250 TABLET, FILM COATED ORAL at 08:36

## 2021-06-22 RX ADMIN — INSULIN LISPRO 1 UNITS: 100 INJECTION, SOLUTION INTRAVENOUS; SUBCUTANEOUS at 12:13

## 2021-06-22 ASSESSMENT — PAIN SCALES - GENERAL: PAINLEVEL_OUTOF10: 0

## 2021-06-22 NOTE — PATIENT CARE CONFERENCE
[facility]  Inpatient Rehabilitation  Weekly Team Conference Note      Date: 2021  Patient Name:  Carolina Markham    MRN: [id number]  : 1955  Gender: female  Physician: Dr Nae Pruett  Diagnosis: Upper GI bleed [K92.2]    CASE MANAGEMENT  Assessment: Goal is home with        PHYSICAL THERAPY    Bed mobility  Bridging: Independent  Rolling to Left: Independent  Rolling to Right: Independent  Supine to Sit: Independent  Sit to Supine: Independent  Scooting: Independent    Transfers:  Sit to Stand: Supervision  Stand to sit: Supervision  Bed to Chair: Supervision    Ambulation 1  Surface: level tile, carpet  Device: No Device  Assistance: Supervision  Quality of Gait: Fast pace, step-through pattern, mild inermittent sway/veer to R (reports this is chronic), no overt LOB. Distance: 550'; 30' x2; 150' x2    Stairs  # Steps : 20  Stairs Height: 6\"  Rails: Right ascending  Curbs: 6\"  Device: No Device (no device curb; R ascending HR and L descending HR for stairs)  Assistance: Supervision  Comment: Pt able to ascend/descend 20 steps with reciprocal pattern, no LOB, no c/o fatigue, supervision,  ascend/descend curb step twice with supervision and without device    Car Transfer: Supervision      Assessment: Pt is a 74 y.o. F. admitted  for SILVESTRE, Anemia; UGI bleed. Today , pt able to ambulate 550' without AD and with supervision. Transfers, including car, supervision. Pt able to perform bed mobility independently. Completed 20 stairs with supervision using R ascending HR and L HR descending with reciprocal pattern. 6\" curb step without AD with supervision. Pt shows difficulty with dynamic balance activities need CGA-Davis. Anticipate return home with , prn assist, no home PT needed. May benefit from OP PT for balance impairments, f/u pending progress.       SPEECH THERAPY (intentionally left blank if not actively being seen by this service):      OCCUPATIONAL THERAPY      ADL  Grooming: Stand by assistance (in stance at sink; wash hands, brush teeth, comb hair)  UE Bathing: Supervision, Setup (seated on chair)  LE Bathing: Stand by assistance, Setup (supervision seated; SBA in stance for buttocks)  UE Dressing: Supervision, Setup (seated)  LE Dressing: Stand by assistance, Setup (seated with supervision to don/doff bilateral socks; SBA for balance in stance to manage pants over hips)  Toileting: Stand by assistance (SBA for balance in stance; pt able to manage pants and complete pericare)  Additional Comments: Anticipate pt needing up to SBA for ADLs including dressing, bathing, and toileting based on ROM, strength, and balance     IADL:  Pt ambulated from table in day room without a device and SBA to carry a light laundry basket to the washer to load/unload clothing items from washer at low level. Pt educated to lean on sturdy objects when reaching outside base of support, pt demoed understanding. Pt able to load/unload washer w/ supervision. Stood at tabletop w/ supervision to fold articles of clothing and place them back into basket. Bed mobility  Bridging: Independent  Rolling to Left: Independent  Rolling to Right: Independent  Supine to Sit: Independent  Sit to Supine: Independent  Scooting: Independent    Functional Transfers: Toilet Transfers  Toilet - Technique: Ambulating  Equipment Used: Grab bars (used R rail only, as sink at home)  Toilet Transfer: Stand by assistance, Supervision  Toilet Transfers Comments: no device  Tub Transfers  Tub - Transfer From: Bed  Tub - Transfer Type: To and From  Tub - Transfer To: Standing  Tub - Technique: Ambulating  Tub Transfers: Stand by assistance, Supervision  Shower Transfers  Shower - Transfer From: Other (no device)  Shower - Transfer Type: To and From  Shower - Transfer To:  Standing  Shower - Technique: Ambulating  Shower Transfers: Supervision  Shower Transfers Comments: Pt able to perform dry shower transfer w/ supervision and no support from walls or chair    UE Function: WFL    Assessment: Pt completed all functional mob/transfers w/ supervision and no device this date. Pt completed simple IADL task of laundry w/ supervision while safety reaching outside base of support. Pt completed simple IADL task of community re-entry and mobility by ambulating around terrace w/ supervision to check the mail. Pt completed exercises w/ 4# medicine ball to increase strength for ADL/IADL tasks. Pt progressing well in therapy, plan to cont per POC Rec ready for D/C Thurs after therapy, will not need con'td OT post D/C.      NUTRITION  Most recent weightWeight: 135 lb 2.3 oz (61.3 kg)  BMI (Calculated): 24   Diet Order: ADULT DIET; Regular; 4 carb choices (60 gm/meal)  PO intake: % : pt states light eater at baseline  Please see nutrition note for details. NURSING  Continent of bladder and bowel. Monitor and maintain skin integrity. Family Education: Patient education: diabetic needs, safety and fall prevention, medications, pain control as needed, skin care and prevention. MEDICAL  Monitoring WBC given ongoing leukocytosis. Repeat infectious work-up initiated.  Treating now for UTI   Also monitoring Cr in setting of SILVESTRE  Transfusing for anemia  Starting pul/cardiac work-up due to new shortness of breath    TEAM SUMMARY AND DISCHARGE PLAN  Estimated Length of Stay: DC 2-3 days pending medical stability  Destination: home with  and outpatient PT   · Anticipated Services at Discharge:    [] OT  [x] PT   [] SLP    [] RN   [] Home Health aide []   Community Resources: _______________________________  Equipment recommendations:  [] Hospital bed [] Tub bench  [x] Shower chair [] Hand held shower  [] Raised toilet seat [] Toilet safety frame [] Bedside commode   [] W/C: _____  [] Rolling Walker [] Standard walker [] Gait belt [] cane: _________  [] Sliding board [] Alternate seating/furniture [] O2 [] Hip Kit: _______  [] Life Line [] Other: _______  Factors facilitating achievement of predicted outcomes: Family support, Caregiver support, Motivated, Cooperative and Pleasant  Barriers to the achievement of predicted outcomes/Interventions:   Fatigue- may be related to low H&H, UTI , antibiotics for UTI; receiving blood transfusion today, continue strengthening, conditioning, activity pacing and energy conservation strategies, compensatory strategies for safe self care and mobility        Interdisciplinary Individualized Plan of Care Review:    · Continue Current Plan of Care: Yes    · Modifications:_____________________________    Special Needs in the Upcoming Week :    [] Family/Caregiver Education  [] Home visit  []Therapeutic Pass   [] Consults:_______    [] Other;_______    Patient Rehab Team Goals for the Upcoming Week:  1. Pt will complete all ADL/simple IADL tasks w/ mod I across all disciplines   2. Pt will complete all ambulation and transfers mod I across all disciplines  3. Patient goals : To improve her balance and core strength          Team Members Present at Conference:  Physician: Dr Makenna Baron  [facility]    Occupational Therapist: Santino Spear, S/OT, Raymond Brand OTR/L #[id number]   Physical Ashley Henriquez, [id number]  Speech Therapist:   Nurse:MARIBELL Morley RN, CRRN  Dietitian: Lora Nguyen, RD, LD   Juliann Sharpe,PT MPT       I led this team conference and I approve the established interdisciplinary plan of care as documented within the medical record of Chantelle Dove MD: Keith Burkett.  Makenna Baron MD 6/23/2021, 4:25 PM

## 2021-06-22 NOTE — PROGRESS NOTES
Occupational Therapy  Facility/Department: [address] IP REHAB  Daily Treatment Note  AM and PM Session    NAME: Carolina Markham  : 1955  MRN: [id number]    Date of Service: 2021    Discharge Recommendations:  Home with assist PRN  OT Equipment Recommendations  Other: possible shower chair; continue to assess    Assessment   Performance deficits / Impairments: Decreased functional mobility ; Decreased endurance;Decreased ADL status; Decreased high-level IADLs;Decreased balance  Assessment: Pt completed all functional mob/transfers w/ supervision and no device this date. Pt completed simple IADL task of laundry w/ supervision while safety reaching outside base of support. Pt completed simple IADL task of community re-entry and mobility by ambulating around terrace w/ supervision to check the mail. Pt completed exercises w/ 4# medicine ball to increase strength for ADL/IADL tasks. Pt progressing well in therapy, plan to cont per POC  Treatment Diagnosis: decreased endurance, ADLs, balance, IADLs  Prognosis: Good  OT Education: OT Role;Plan of Care;Transfer Training;ADL Adaptive Strategies; Home Exercise Program;IADL Safety; Energy Conservation  REQUIRES OT FOLLOW UP: Yes  Activity Tolerance  Activity Tolerance: Patient Tolerated treatment well  Safety Devices  Safety Devices in place: Yes  Type of devices: Gait belt;Patient at risk for falls; Left in chair;Chair alarm in place;Call light within reach         Patient Diagnosis(es): There were no encounter diagnoses. has a past medical history of CAD (coronary artery disease), Diabetes mellitus ([facility] [address] Hyperlipidemia, Shingles, and Thyroid disease. has a past surgical history that includes eye surgery; sinus surgery; and Cholecystectomy.     Restrictions  Restrictions/Precautions  Restrictions/Precautions: Fall Risk  Subjective   General  Chart Reviewed: Yes, Progress Notes  Patient assessed for rehabilitation services?: Yes  Additional Pertinent Hx: Patient is a 73 yo F with pmh DM2 and hypothyroidism who intitially presented to [facility] on 6/12/2021 with dark diarrhea, n/v, and progressive weakness. She did have a fall prior to presenting to the ED, fell backwards and did hit the back of her head. Found to have upper GI bleed. EGD revealed ulcer at GE junction, minimal gastric erosion in the antrum. She was started on ppi and Venofer. Of note, patient has been taking NSAIDs daily for 3 months due to back pain related to T12 compression fracture. Course was complicated by SILVESTRE, NSTEMI, DD2 on TTE. 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 Now presents to ARU with impaired mobility and self-care below her baseline. Currently, patient reports generalized weakness and fatigue. She has mild back pain. She denies abdominal pain, n/v, blood in stool. She is motivated to start inpatient rehab program.  Response to previous treatment: Patient with no complaints from previous session  Family / Caregiver Present: No  Referring Practitioner: Chuck Templeton MD  Diagnosis: Upper GI Bleed  Subjective  Subjective: Pt met in dept, agreeable to OT session, pt denies pain, states that she feels a bit sore in her back, but did not rate pain. Orientation  Orientation  Overall Orientation Status: Within Functional Limits  Objective       Instrumental ADL's  Instrumental ADLs: Yes  Light Housekeeping  Light Housekeeping Level of Assistance: SBA/Supervision  Light Housekeeping: Pt ambulated from table in day room without a device and SBA to carry a light laundry basket to the washer to load/unload clothing items from washer at low level. Pt educated to lean on sturdy objects when reaching outside base of support, pt demoed understanding. Pt able to load/unload washer w/ supervision. Stood at tabletop w/ supervision to fold articles of clothing and place them back into basket.   Commmunity Re-entry  Community Re-entry Level of Assistance: Supervision  Community Re-Entry: Pt ambulated around [facility] terrace w/ supervision and no device. Pt checked mailbox and ambulated around different surfaces to simulate community re-entry. Pt did not require additional cues for safety during simple IADL task. Balance  Sitting Balance: Supervision  Standing Balance: Supervision  Standing Balance  Time: untimed  Activity: short stands for simple IADL tasks  Functional Mobility  Functional - Mobility Device: No device  Activity: To/From therapy gym  Assist Level: Supervision  Functional Mobility Comments: pt ambulated without device w/ supervision around dept and day room  Shower Transfers  Shower - Transfer From: Other (no device)  Shower - Transfer Type: To and From  Shower - Transfer To: Standing  Shower - Technique: Ambulating  Shower Transfers: Supervision  Shower Transfers Comments: Pt able to perform dry shower transfer w/ supervision and no support from walls or chair  Wheelchair Bed Transfers  Wheelchair/Bed - Technique: Ambulating  Equipment Used: Wheelchair  Level of Asssistance: Supervision  Wheelchair Transfers Comments: no device, ambulated >< couch in day room, educated to sit by arm rest for support, pt performed sit >< stand w/ supervision and use of arm rest on couch                             Cognition  Overall Cognitive Status: WFL                    Type of ROM/Therapeutic Exercise  Type of ROM/Therapeutic Exercise: Medicine Ball  Comment: Pt completed BUE exercises to increase strength and ROM to BUE for ADL/IADL tasks. Pt completed exercises w/ 4# medicine ball while seated in w/c and educated to stop if painful. Pt completed 15 reps of each exercises: shoulder flex/ext, chest press, horizontal IR/ER, and bicep curls                    PM Session:   S: Pt met in dept, agreeable to afternoon OT session. Denies pain, but states that she is a little bit sore, did not rate.     O:   Pt completed IADL task of gathering materials needed to make grilled cheese sandwich in [facility] ADL kitchen w/ supervision and no device. Pt able to correctly gather items and safely make sandwich without additional cues for safety in the kitchen. Pt stood for ~8 minutes to complete this task. Pt brought down to the [facility] in a w/c, able to ambulate around and look at objects in gift shop w/ supervision and no device. Pt then walked around front entrance and outside hospital for 10 minutes to increase activity tolerance and community re-entry. Pt brought back to [facility] and stood w/ supervision with no device to complete forward pass game. Pt able to complete 30 passes then sat at w/c for a 2 minute break. Stood back up w/ supervision to complete 30 more passes. Pt completed multiple sit >< stand transfers from w/c w/ no device w/ supervision for balance and safety. Pt completed transfer from w/c to recliner without device and supervision for balance and safety of pt. Pt left in recliner in room at end of session w/ all needs met. Assessment: Pt completed all functional mob/transfers w/ supervision without a device for balance and safety of pt. Pt completed IADL task of making a grilled cheese sandwich in dept kitchen w/ supervision. Pt completed IADL task of community re-entry to walk around hospital gift shop and front of hospital w/ supervision to simulate a community dwelling task. Pt continues to make great progress in therapy, plan to cont per POC.      Safety Device - Type of devices:  []  All fall risk precautions in place [] Bed alarm in place  [x] Call light within reach [x] Chair alarm in place [] Positioning belt [x] Gait belt [x] Patient at risk for falls [] Left in bed [x] Left in chair [] Telesitter in use [] Sitter present [] Nurse notified []  None     Plan   Plan  Times per week: 5x  Times per day: Twice a day  Current Treatment Recommendations: Strengthening, Balance Training, Safety Education & Training, Self-Care / ADL, Equipment

## 2021-06-22 NOTE — PLAN OF CARE
Problem: Falls - Risk of:  Goal: Will remain free from falls  Description: Will remain free from falls  Outcome: Ongoing   Patient remained free of any falls this shift. Call light in reach at all times. Non skid footwear on. Patient encouraged to call for help when needed. Room free of clutter. Alarms on. Problem: Infection:  Goal: Will remain free from infection  Description: Will remain free from infection  Outcome: Ongoing     Problem: Safety:  Goal: Free from accidental physical injury  Description: Free from accidental physical injury  Outcome: Ongoing     Problem: Daily Care:  Goal: Daily care needs are met  Description: Daily care needs are met  Outcome: Ongoing     Problem: Pain:  Goal: Patient's pain/discomfort is manageable  Description: Patient's pain/discomfort is manageable  Outcome: Ongoing   Pain assessed using 0-10 scale. Offer PRN medication as ordered by MD. Fátima Chambers 30 minutes after giving.      Problem: Skin Integrity:  Goal: Skin integrity will stabilize  Description: Skin integrity will stabilize  Outcome: Ongoing     Problem: Discharge Planning:  Goal: Patients continuum of care needs are met  Description: Patients continuum of care needs are met  Outcome: Ongoing

## 2021-06-22 NOTE — PROGRESS NOTES
Hematocrit 23.1 (L) 36.0 - 48.0 %    MCV 88.0 80.0 - 100.0 fL    MCH 29.2 26.0 - 34.0 pg    MCHC 33.2 31.0 - 36.0 g/dL    RDW 14.8 12.4 - 15.4 %    Platelets 504 (H) 186 - 450 K/uL    MPV 6.6 5.0 - 10.5 fL    Neutrophils % 85.4 %    Lymphocytes % 8.8 %    Monocytes % 4.4 %    Eosinophils % 1.2 %    Basophils % 0.2 %    Neutrophils Absolute 14.9 (H) 1.7 - 7.7 K/uL    Lymphocytes Absolute 1.5 1.0 - 5.1 K/uL    Monocytes Absolute 0.8 0.0 - 1.3 K/uL    Eosinophils Absolute 0.2 0.0 - 0.6 K/uL    Basophils Absolute 0.0 0.0 - 0.2 K/uL   Basic Metabolic Panel    Collection Time: 06/22/21  7:08 AM   Result Value Ref Range    Sodium 142 136 - 145 mmol/L    Potassium 3.7 3.5 - 5.1 mmol/L    Chloride 108 99 - 110 mmol/L    CO2 23 21 - 32 mmol/L    Anion Gap 11 3 - 16    Glucose 84 70 - 99 mg/dL    BUN 43 (H) 7 - 20 mg/dL    CREATININE 1.8 (H) 0.6 - 1.2 mg/dL    GFR Non-African American 28 (A) >60    GFR  34 (A) >60    Calcium 8.0 (L) 8.3 - 10.6 mg/dL   Magnesium    Collection Time: 06/22/21  7:08 AM   Result Value Ref Range    Magnesium 2.00 1.80 - 2.40 mg/dL   POCT Glucose    Collection Time: 06/22/21  7:11 AM   Result Value Ref Range    POC Glucose 84 70 - 99 mg/dl    Performed on ACCU-CHEK    POCT Glucose    Collection Time: 06/22/21 11:04 AM   Result Value Ref Range    POC Glucose 144 (H) 70 - 99 mg/dl    Performed on ACCU-CHEK        Therapy progress:  PT     Objective     Sit to Stand: Supervision  Stand to sit: Supervision  Bed to Chair: Supervision  Device: No Device  Assistance: Supervision  Distance: 550'; 30' x2; 150' x2  OT  PT Equipment Recommendations  Equipment Needed: No  Toilet - Technique: Ambulating  Equipment Used: Grab bars (used R rail only, as sink at home)  Toilet Transfers Comments: no device  Assessment        SLP          Body mass index is 23.94 kg/m².     Rehabilitation Diagnosis:   16.0, Debility (non-cardiac, non-pulmonary        Assessment and Plan:     Impairments:  generalized weakness, decreased balance, endurance, spine ROM     Debility  -PT/OT     Upper GI bleed  -EGD with gastric ulcer, antral erosions  -NSAIDs dc'ed  -ppi     Acute blood loss anemia   -Due to GIB. -Monitor Hgb, transfuse prn <7.      SILVESTRE  -Possibly AIN  -Avoid nephrotoxins, renally dose meds  -Monitor renal function. Cr stable ~2     Leukocytosis with eosinophilia  -Per Good Michael, possibly due to AIN  -WBC increased on admission labs. Work-up initiated:  ---CXR negative, procalcitonin elevated  ---Blood cultures negative  ---Lactate wnl  ---UA+, culture contaminated. Started empiric cefdinir. Repeat UA still positive, switched to levofloxacin (6/21). F/u repeat culture.   -Patient remains afebrile and asymptomatic.      Subacute T12 compression fracture  -Pain control with acetaminophen and lidoderm prn  -PT/OT     NSTEMI type II  -statin, bb     Diastolic dysfunction on TTE  -Daily wt     HTN  -metoprolol, nifedipine (increase dose) -- monitor response     DM2  -prandial 5 TID, ISS  -Dc lantus due to morning hypoglycemia     Hypothyroidism  -levothyroxine     Bladder   -High risk retention   -Monitor PVRs, SC prn >300cc     Bowel   -High risk constipation   -senna+colace BID, PRN miralax, MoM, and bisacodyl supp.     Safety   -fall precautions     PPx  -DVT: Hold due to bleed risk  -GI: pantoprazole       Rehab Progress: Making progress. Working on strength, endurance, balance. Anticipated Dispo: home with   Services/DME: TBD  ELOS: 5-7 days      [address]  Hans Blackman MD 6/22/2021, 2:19 PM

## 2021-06-22 NOTE — PROGRESS NOTES
Patient admitted with debility s/p UTI. Alert/oriented. On Lovenox for DVT prophylaxis. Transfers using gait belt. Continent of bowel and bladder. On room air with clear/diminished lungs. Takes medications whole with thins with no complications.  spending night. Uses call light appropriately. Safety precautions in place.

## 2021-06-22 NOTE — PLAN OF CARE
Problem: Falls - Risk of:  Goal: Will remain free from falls  Description: Will remain free from falls  6/21/2021 1002 by Francisco Bardales RN  Outcome: Ongoing     Problem: Falls - Risk of:  Goal: Absence of physical injury  Description: Absence of physical injury  Outcome: Ongoing     Problem: Infection:  Goal: Will remain free from infection  Description: Will remain free from infection  Outcome: Ongoing     Problem: Safety:  Goal: Free from accidental physical injury  Description: Free from accidental physical injury  Outcome: Ongoing     Problem: Safety:  Goal: Free from intentional harm  Description: Free from intentional harm  Outcome: Ongoing     Problem: Daily Care:  Goal: Daily care needs are met  Description: Daily care needs are met  Outcome: Ongoing     Problem: Pain:  Goal: Patient's pain/discomfort is manageable  Description: Patient's pain/discomfort is manageable  Outcome: Ongoing     Problem: Skin Integrity:  Goal: Skin integrity will stabilize  Description: Skin integrity will stabilize  Outcome: Ongoing     Problem: Discharge Planning:  Goal: Patients continuum of care needs are met  Description: Patients continuum of care needs are met  Outcome: Ongoing     Problem: ABCDS Injury Assessment  Goal: Absence of physical injury  Outcome: Ongoing     Problem: Metabolic:  Goal: Ability to maintain appropriate glucose levels will improve  Description: Ability to maintain appropriate glucose levels will improve  Outcome: Ongoing

## 2021-06-22 NOTE — PROGRESS NOTES
Physical Therapy  Facility/Department: [address] IP REHAB  Daily Treatment Note  NAME: Vinayak George  : 1955  MRN: [id number]    Date of Service: 2021    Discharge Recommendations:  Home with assist PRN, Outpatient PT, Patient would benefit from continued therapy after discharge   PT Equipment Recommendations  Equipment Needed: No    Assessment   Body structures, Functions, Activity limitations: Decreased functional mobility   Assessment: Pt is a 74 y.o. F. admitted  for SILVESTRE, Anemia; UGI bleed. Today , pt able to ambulate 550' without AD and with supervision and minimal fatigue. Transfers, including car, were supervision. Pt able to perform bed mobility independently. Completed 20 stairs with supervision using R HR ascending  and L HR descending with reciprocal pattern. 6\" curb step without AD and with supervision. Pt shows difficulty with dynamic balance activities needing CGA-Davis for small lateral LOB. Anticipate return home with , prn assist, no home PT needed. May benefit from OP PT for balance impairments, f/u pending progress. Specific instructions for Next Treatment: Tandem stance; single-leg stand; sit < > stand without UE support  PT Education: Goals; General Safety;PT Role;Orientation; Functional Mobility Training;Plan of Care;Home Exercise Program;Precautions  REQUIRES PT FOLLOW UP: Yes  Activity Tolerance  Activity Tolerance: Patient Tolerated treatment well  Activity Tolerance: limited by decreased balance with dynamic balance activities     Patient Diagnosis(es): There were no encounter diagnoses. has a past medical history of CAD (coronary artery disease), Diabetes mellitus ([address] Hyperlipidemia, Shingles, and Thyroid disease. has a past surgical history that includes eye surgery; sinus surgery; and Cholecystectomy.     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 Kirsten Salinas, \"Patient is a 73 yo F with pmh DM2 and hypothyroidism who initially presented to [facility] on 6/12/2021 with dark diarrhea, n/v, and progressive weakness. She did have a fall prior to presenting to the ED, fell backwards and did hit the back of her head. Found to have upper GI bleed. EGD revealed ulcer at GE junction, minimal gastric erosion in the antrum. She was started on ppi and Venofer. Of note, patient has been taking NSAIDs daily for 3 months due to back pain related to T12 compression fracture. Course was complicated by SILVESTRE, NSTEMI, DD2 on TTE. 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 Now presents to ARU with impaired mobility and self-care below her baseline. Currently, patient reports generalized weakness and fatigue. She has mild back pain. She denies abdominal pain, n/v, blood in stool. She is motivated to start inpatient rehab program.\"  Response To Previous Treatment: Not applicable  Referring Practitioner: Dr. Kirsten Salinas  Subjective  Subjective: Pt pleasant and agreeable to PT. Reports 3/10 back pain.   General Comment  Comments: Goes by \" Chanel\"      Objective   Bed mobility  Bridging: Independent  Rolling to Left: Independent  Rolling to Right: Independent  Supine to Sit: Independent  Sit to Supine: Independent  Scooting: Independent  Transfers  Sit to Stand: Supervision  Stand to sit: Supervision  Car Transfer: Supervision  Ambulation  Ambulation?: Yes  More Ambulation?: Yes  Ambulation 1  Surface: level tile;carpet  Device: No Device  Assistance: Supervision  Quality of Gait: Fast pace, step-through pattern, mild intermittent sway/veer to R (reports this is chronic), no overt LOB. Distance: 550'; 30' x2; 150' x2  Stairs/Curb  Stairs?: Yes  Stairs  # Steps : 21  Stairs Height: 6\"  Rails: Right ascending  Curbs: 6\"  Device: No Device (no device curb; R ascending HR and L descending HR for stairs)  Assistance: Supervision  Comment: Pt able to ascend/descend 20 steps with reciprocal pattern, no LOB, no c/o fatigue, supervision,  ascend/descend curb step twice with supervision and without device        Exercises  Hip Flexion: seated marches x35  Comments: standing  in // bars without using HRs alternating BL toe taps onto free moving bar (~6\" off ground) x25 ea CGA; side steps over bar without HR CGA x 20 BL; toe taps on 6\" step reaching for second line x20 BL CGA; alt BL toe taps on 6\" block with blue pad under feet CGA-Davis, 1 instance of LOB with Davis to assist correction; Balance without blue pad intermittent small LOB but pt able to self correct with no more than CGA; more difficulty with SLS on L LE  Other exercises  Other exercises?: Yes  Other exercises 1: Tandem walking 40' with CGA-Davis; Pt lateral sway throughout and difficulty getting heel to toe with BL feet, small LOB throughout that required Davis  intermittently. (Pt reports she has always had balance difficulties and this activity would have been just as hard before the recent incident.)           PM session:   S: Pt reports slight back fatigue and soreness following OT and PT today ~3/10. O:   Sit<> Stand transfer supervision  amb on uneven terrain therapy terrace ~150' with SBA-CGA with turns on un even surfaces.  Slight lateral LOBs but pt able to self correct Without rest break, 30' Up/down ramp with SBA and R HR  Without sitting rest break, pt went from therapy terrace 500' with supervision-SBA. CGA while pt opened therapy door from outside   Cued pt to look up/down side to side while ambulating to stimulate distracted walking. As pt fatigues, less sure footed and coordinated with ambulation SBA-CGA for safety. Seated glute squeezes 5 sec x 10  Transverse abdominis contraction 5 sec x 10 seated in wc  fwd/bwd/sideways steps over cross bars. CGA for safety x 8 ea side. Instances of small LOB laterally but pt able to correct her position with no more than CGA. Cued to take her time as fatigues with all exercises  Karaoke walking 3 laps to L and R CGA and first lap using HR, second two without HR but several small lateral LOB needing Davis. Pt with decreased coordination going to R with most weight on L LE. A: pt continues to ambulate community distances with supervision. Transfers consistently supervision. With increased ambulation distance, pt has increased lateral sway and decreased coordination needing SBA-CGA. Dynamic SL balance activities are difficult for pt and cause pt intermittent LOB that require CGA-Davis to correct. Discussed OP PT upon dc to continue balance training. Safety Device - Type of devices:  [x]  All fall risk precautions in place [] Bed alarm in place  [] Call light within reach [] Chair alarm in place [] Positioning belt [x] Gait belt [x] Patient at risk for falls [] Left in bed [x] Left in chair [] Telesitter in use [] Sitter present [] Nurse notified [x]  Left in transport line     Goals  Short term goals  Time Frame for Short term goals:  In 5 days pt will perform  Short term goal 1: Bed mobility (I)  Short term goal 2: Transfers (I)  Short term goal 3: Ambulation 500' (I) without device  Short term goal 4: Ascend/Descend 12 steps with single HR support, mod I, reciprocal pattern  Short term goal 5: Ascend/Descend curb step

## 2021-06-22 NOTE — PROGRESS NOTES
Patient admitted to rehab with debility s/p UTI and upper GI bleed. A/A/O x 4. Transfers with SBA w/ gait belt,  approved by therapy to transfer. On carb control diet, tolerating well. Medications taken whole. DVT prophylaxis on hold. Skin intact. On room air. Has been continent of bowel and bladder. LBM 6/20. Chair/bed alarms in use and call light in reach. Will monitor for safety.

## 2021-06-23 ENCOUNTER — APPOINTMENT (OUTPATIENT)
Dept: CT IMAGING | Age: 66
DRG: 947 | End: 2021-06-23
Attending: PHYSICAL MEDICINE & REHABILITATION
Payer: MEDICARE

## 2021-06-23 LAB
ABO/RH: NORMAL
ANION GAP SERPL CALCULATED.3IONS-SCNC: 13 MMOL/L (ref 3–16)
ANTIBODY SCREEN: NORMAL
BASOPHILS ABSOLUTE: 0.1 K/UL (ref 0–0.2)
BASOPHILS RELATIVE PERCENT: 0.5 %
BLOOD CULTURE, ROUTINE: NORMAL
BUN BLDV-MCNC: 48 MG/DL (ref 7–20)
CALCIUM SERPL-MCNC: 8 MG/DL (ref 8.3–10.6)
CHLORIDE BLD-SCNC: 106 MMOL/L (ref 99–110)
CO2: 20 MMOL/L (ref 21–32)
CREAT SERPL-MCNC: 1.9 MG/DL (ref 0.6–1.2)
CULTURE, BLOOD 2: NORMAL
EOSINOPHILS ABSOLUTE: 0.1 K/UL (ref 0–0.6)
EOSINOPHILS RELATIVE PERCENT: 0.7 %
GFR AFRICAN AMERICAN: 32
GFR NON-AFRICAN AMERICAN: 26
GLUCOSE BLD-MCNC: 156 MG/DL (ref 70–99)
GLUCOSE BLD-MCNC: 220 MG/DL (ref 70–99)
GLUCOSE BLD-MCNC: 246 MG/DL (ref 70–99)
GLUCOSE BLD-MCNC: 288 MG/DL (ref 70–99)
GLUCOSE BLD-MCNC: 288 MG/DL (ref 70–99)
GLUCOSE BLD-MCNC: 302 MG/DL (ref 70–99)
HCT VFR BLD CALC: 20.2 % (ref 36–48)
HEMOGLOBIN: 6.9 G/DL (ref 12–16)
HEMOGLOBIN: 6.9 G/DL (ref 12–16)
LYMPHOCYTES ABSOLUTE: 1.2 K/UL (ref 1–5.1)
LYMPHOCYTES RELATIVE PERCENT: 6.6 %
MAGNESIUM: 1.9 MG/DL (ref 1.8–2.4)
MCH RBC QN AUTO: 30 PG (ref 26–34)
MCHC RBC AUTO-ENTMCNC: 34.2 G/DL (ref 31–36)
MCV RBC AUTO: 87.7 FL (ref 80–100)
MONOCYTES ABSOLUTE: 0.8 K/UL (ref 0–1.3)
MONOCYTES RELATIVE PERCENT: 4.7 %
NEUTROPHILS ABSOLUTE: 15.7 K/UL (ref 1.7–7.7)
NEUTROPHILS RELATIVE PERCENT: 87.5 %
ORGANISM: ABNORMAL
PDW BLD-RTO: 14.9 % (ref 12.4–15.4)
PERFORMED ON: ABNORMAL
PLATELET # BLD: 377 K/UL (ref 135–450)
PMV BLD AUTO: 6.2 FL (ref 5–10.5)
POTASSIUM SERPL-SCNC: 5.1 MMOL/L (ref 3.5–5.1)
RBC # BLD: 2.3 M/UL (ref 4–5.2)
SODIUM BLD-SCNC: 139 MMOL/L (ref 136–145)
URINE CULTURE, ROUTINE: ABNORMAL
WBC # BLD: 18 K/UL (ref 4–11)

## 2021-06-23 PROCEDURE — 86923 COMPATIBILITY TEST ELECTRIC: CPT

## 2021-06-23 PROCEDURE — 86901 BLOOD TYPING SEROLOGIC RH(D): CPT

## 2021-06-23 PROCEDURE — 6370000000 HC RX 637 (ALT 250 FOR IP): Performed by: PHYSICAL MEDICINE & REHABILITATION

## 2021-06-23 PROCEDURE — 2580000003 HC RX 258: Performed by: PHYSICAL MEDICINE & REHABILITATION

## 2021-06-23 PROCEDURE — 76937 US GUIDE VASCULAR ACCESS: CPT

## 2021-06-23 PROCEDURE — 85018 HEMOGLOBIN: CPT

## 2021-06-23 PROCEDURE — 86900 BLOOD TYPING SEROLOGIC ABO: CPT

## 2021-06-23 PROCEDURE — 6360000002 HC RX W HCPCS: Performed by: PHYSICAL MEDICINE & REHABILITATION

## 2021-06-23 PROCEDURE — 97535 SELF CARE MNGMENT TRAINING: CPT

## 2021-06-23 PROCEDURE — 80048 BASIC METABOLIC PNL TOTAL CA: CPT

## 2021-06-23 PROCEDURE — P9016 RBC LEUKOCYTES REDUCED: HCPCS

## 2021-06-23 PROCEDURE — 85025 COMPLETE CBC W/AUTO DIFF WBC: CPT

## 2021-06-23 PROCEDURE — 36415 COLL VENOUS BLD VENIPUNCTURE: CPT

## 2021-06-23 PROCEDURE — 1280000000 HC REHAB R&B

## 2021-06-23 PROCEDURE — 86850 RBC ANTIBODY SCREEN: CPT

## 2021-06-23 PROCEDURE — 71250 CT THORAX DX C-: CPT

## 2021-06-23 PROCEDURE — 93005 ELECTROCARDIOGRAM TRACING: CPT | Performed by: PHYSICAL MEDICINE & REHABILITATION

## 2021-06-23 PROCEDURE — 97530 THERAPEUTIC ACTIVITIES: CPT

## 2021-06-23 PROCEDURE — 97110 THERAPEUTIC EXERCISES: CPT

## 2021-06-23 PROCEDURE — 2700000000 HC OXYGEN THERAPY PER DAY

## 2021-06-23 PROCEDURE — 36430 TRANSFUSION BLD/BLD COMPNT: CPT

## 2021-06-23 PROCEDURE — 83735 ASSAY OF MAGNESIUM: CPT

## 2021-06-23 PROCEDURE — 94761 N-INVAS EAR/PLS OXIMETRY MLT: CPT

## 2021-06-23 RX ORDER — SODIUM CHLORIDE 0.9 % (FLUSH) 0.9 %
5-40 SYRINGE (ML) INJECTION EVERY 12 HOURS SCHEDULED
Status: DISCONTINUED | OUTPATIENT
Start: 2021-06-23 | End: 2021-06-28 | Stop reason: HOSPADM

## 2021-06-23 RX ORDER — SODIUM CHLORIDE 9 MG/ML
25 INJECTION, SOLUTION INTRAVENOUS PRN
Status: DISCONTINUED | OUTPATIENT
Start: 2021-06-23 | End: 2021-06-28 | Stop reason: HOSPADM

## 2021-06-23 RX ORDER — SODIUM CHLORIDE 9 MG/ML
INJECTION, SOLUTION INTRAVENOUS PRN
Status: DISCONTINUED | OUTPATIENT
Start: 2021-06-23 | End: 2021-06-28 | Stop reason: HOSPADM

## 2021-06-23 RX ORDER — LIDOCAINE HYDROCHLORIDE 10 MG/ML
5 INJECTION, SOLUTION EPIDURAL; INFILTRATION; INTRACAUDAL; PERINEURAL ONCE
Status: DISCONTINUED | OUTPATIENT
Start: 2021-06-23 | End: 2021-06-28 | Stop reason: HOSPADM

## 2021-06-23 RX ORDER — INSULIN GLARGINE 100 [IU]/ML
7 INJECTION, SOLUTION SUBCUTANEOUS NIGHTLY
Status: DISCONTINUED | OUTPATIENT
Start: 2021-06-23 | End: 2021-06-25

## 2021-06-23 RX ORDER — SODIUM CHLORIDE 0.9 % (FLUSH) 0.9 %
5-40 SYRINGE (ML) INJECTION PRN
Status: DISCONTINUED | OUTPATIENT
Start: 2021-06-23 | End: 2021-06-28 | Stop reason: HOSPADM

## 2021-06-23 RX ORDER — METOPROLOL TARTRATE 50 MG/1
50 TABLET, FILM COATED ORAL 2 TIMES DAILY
Status: DISCONTINUED | OUTPATIENT
Start: 2021-06-23 | End: 2021-06-28 | Stop reason: HOSPADM

## 2021-06-23 RX ORDER — IPRATROPIUM BROMIDE AND ALBUTEROL SULFATE 2.5; .5 MG/3ML; MG/3ML
1 SOLUTION RESPIRATORY (INHALATION) EVERY 4 HOURS PRN
Status: DISCONTINUED | OUTPATIENT
Start: 2021-06-23 | End: 2021-06-28 | Stop reason: HOSPADM

## 2021-06-23 RX ADMIN — HYDRALAZINE HYDROCHLORIDE 25 MG: 25 TABLET, FILM COATED ORAL at 01:38

## 2021-06-23 RX ADMIN — LEVOTHYROXINE SODIUM 125 MCG: 0.12 TABLET ORAL at 05:21

## 2021-06-23 RX ADMIN — INSULIN LISPRO 3 UNITS: 100 INJECTION, SOLUTION INTRAVENOUS; SUBCUTANEOUS at 07:52

## 2021-06-23 RX ADMIN — LEVOFLOXACIN 250 MG: 250 TABLET, FILM COATED ORAL at 07:54

## 2021-06-23 RX ADMIN — INSULIN LISPRO 5 UNITS: 100 INJECTION, SOLUTION INTRAVENOUS; SUBCUTANEOUS at 11:33

## 2021-06-23 RX ADMIN — VANCOMYCIN HYDROCHLORIDE 1000 MG: 1 INJECTION, POWDER, LYOPHILIZED, FOR SOLUTION INTRAVENOUS at 22:45

## 2021-06-23 RX ADMIN — METOPROLOL TARTRATE 25 MG: 25 TABLET, FILM COATED ORAL at 07:54

## 2021-06-23 RX ADMIN — NIFEDIPINE 90 MG: 90 TABLET, FILM COATED, EXTENDED RELEASE ORAL at 07:54

## 2021-06-23 RX ADMIN — ATORVASTATIN CALCIUM 40 MG: 40 TABLET, FILM COATED ORAL at 20:47

## 2021-06-23 RX ADMIN — INSULIN LISPRO 5 UNITS: 100 INJECTION, SOLUTION INTRAVENOUS; SUBCUTANEOUS at 07:52

## 2021-06-23 RX ADMIN — INSULIN LISPRO 2 UNITS: 100 INJECTION, SOLUTION INTRAVENOUS; SUBCUTANEOUS at 11:33

## 2021-06-23 RX ADMIN — INSULIN GLARGINE 7 UNITS: 100 INJECTION, SOLUTION SUBCUTANEOUS at 20:47

## 2021-06-23 RX ADMIN — METOPROLOL TARTRATE 50 MG: 50 TABLET, FILM COATED ORAL at 20:48

## 2021-06-23 RX ADMIN — PANTOPRAZOLE SODIUM 40 MG: 40 TABLET, DELAYED RELEASE ORAL at 05:22

## 2021-06-23 RX ADMIN — INSULIN LISPRO 2 UNITS: 100 INJECTION, SOLUTION INTRAVENOUS; SUBCUTANEOUS at 20:46

## 2021-06-23 ASSESSMENT — PAIN SCALES - GENERAL: PAINLEVEL_OUTOF10: 0

## 2021-06-23 NOTE — PROGRESS NOTES
Admitted with debility s/p UTI. Alert/oriented. Transfers with gait belt. Medications taken whole with thins with no complication.  spending night; allowed to transfer patient as well. Denies pain. 0130 Called into patient room where she was sitting on side of bed stating she was hot and generally not feeling well. Vitals taken; elevated blood pressure so PRN Hydralazine was given. Oxygen was originally 88% on room air; encouraged breathing through nose and went to 93%. lL oxygen applied for comfort. Temperature lowered in room. Patient and  stated this happened last night as well but they didn't tell anyone. Encouraged them to keep lines of communication open with staff. Will continue to monitor. [id number] Patient states she feels a little better. /75. O2 at 96% on 2L oxygen; patient states she still feels she is having difficulty breathing. Continuing to monitor.

## 2021-06-23 NOTE — CARE COORDINATION
Team Conference held today  Team reviewed progress and goals. Team recommends continued stay on Acute Rehab to further goals of personal care and ambulation needs. Possible discharge planned for 6/25/21, Friday. SW met with patient and spouse- discussed discharge - plan is outpatient therapy and shower chair- spouse to get shower chair.

## 2021-06-23 NOTE — PROGRESS NOTES
Late Entry: 1572 on 6/23/2021:  Call in from [facility], reporting results from CT scan of chest. Report received. Late Entry: 6976 on 6/23/2021:  Call in to Dr Giuseppe Falcon, report of CT scan results read on confidential voice mail. Night shift RN phone number left for call back.  Electronically signed by Cornelius Robert RN on 6/23/2021 at 7:16 PM

## 2021-06-23 NOTE — PROGRESS NOTES
Call in to MD, Hemoglobin recheck 6.9, order received to place mid line due to 3 attempts to place PIV and all failed. Give only one unit of blood per hospital policy. Will continue to monitor. Electronically signed by Yaz Esteban RN on 6/23/2021 at 12:48 PM     Order placed for mid line, call in to DIT, stated a Mid line was to be inserted. RN will call. Electronically signed by Yaz Esteban RN on 6/23/2021 at 1:14 PM     Late Entry: at [phone] on 6/23/2021:   Unable to place Mid Line IV, face to face with Dr. Zander Amaro and PICC RN from T, explanation of pt having veins too small to place Mid Line, ok to place extended dwell catheter.  Electronically signed by Yaz Esteban RN on 6/23/2021 at 7:22 PM

## 2021-06-23 NOTE — PROGRESS NOTES
Upon arrival to place midline assessed chart for issues related to midline placement, check for consent, and did time out with RN German Ramos. Upon assessment no vessel appropriate for placement of midline. Pt. Tolerated extended dwell catheter placement well, no difficulty accessing basilic vein, threaded well, with free flowing blood return.  Reported off to [facility]

## 2021-06-23 NOTE — PROGRESS NOTES
Physical Therapy  Facility/Department: [address] REHAB  Daily Treatment Note  NAME: Erick Peace  : 1955  MRN: [id number]    Date of Service: 2021    Discharge Recommendations:  Home with assist PRN, Outpatient PT, Patient would benefit from continued therapy after discharge   PT Equipment Recommendations  Equipment Needed: No    Assessment   Body structures, Functions, Activity limitations: Decreased functional mobility   Assessment: Pt is a 74 y.o. F. admitted  for SILVESTRE, Anemia; UGI bleed. Today ,  pt with low HCT and HGB values this am requiring bedside session. Pt able to tolerate bedside exercises maintaining an SpO2 of 95%. No reports of SOB or dizziness. Bed mobility remains independent. Anticipate return home with , prn assist, no home PT needed. May benefit from OP PT for balance impairments, f/u pending progress. Specific instructions for Next Treatment: Tandem stance; single-leg stand; sit < > stand without UE support  REQUIRES PT FOLLOW UP: Yes  Activity Tolerance  Activity Tolerance: Patient Tolerated treatment well  Activity Tolerance: HCT and HGB values low     Patient Diagnosis(es): There were no encounter diagnoses. has a past medical history of CAD (coronary artery disease), Diabetes mellitus ([address] Hyperlipidemia, Shingles, and Thyroid disease. has a past surgical history that includes eye surgery; sinus surgery; and Cholecystectomy.     Restrictions  Restrictions/Precautions  Restrictions/Precautions: Fall Risk     Social/Functional History  Lives With: Spouse  Type of Home: House  Home Layout: One level  Home Access: Stairs to enter without rails  Entrance Stairs - Number of Steps: 2-3 through garage; 5 steps through front/back  Bathroom Shower/Tub: Walk-in shower  Bathroom Toilet: Handicap height  ADL Assistance: [facility]: Independent  Homemaking Responsibilities: Yes  Ambulation Assistance: Independent  Transfer Assistance: Independent  Active : Yes  Occupation: Retired  Type of occupation: small drywall [employer]  Leisure & Hobbies: golf  Additional Comments: Pt reports 2 falls in past 6 months    Subjective   General  Chart Reviewed: Yes  Additional Pertinent Hx: Per Dr. German Hinojosa, \"Patient is a 75 yo F with pmh DM2 and hypothyroidism who initially presented to [facility] on 6/12/2021 with dark diarrhea, n/v, and progressive weakness. She did have a fall prior to presenting to the ED, fell backwards and did hit the back of her head. Found to have upper GI bleed. EGD revealed ulcer at GE junction, minimal gastric erosion in the antrum. She was started on ppi and Venofer. Of note, patient has been taking NSAIDs daily for 3 months due to back pain related to T12 compression fracture. Course was complicated by SILVESTRE, NSTEMI, DD2 on TTE. 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 Now presents to ARU with impaired mobility and self-care below her baseline. Currently, patient reports generalized weakness and fatigue. She has mild back pain. She denies abdominal pain, n/v, blood in stool. She is motivated to start inpatient rehab program.\"  Response To Previous Treatment: Not applicable  Referring Practitioner: Dr. German Hinojosa  Subjective  Subjective: Pt with fatigue and low endurance this AM. Pt reports feeling \"terrible\" last night and a little better this AM.  General Comment  Comments: Pt sitting in room with  upon arrival. 95% O2 at start.  Akbar MUNIZ requests pt seen at bedside      Objective   Bed mobility  Supine to Sit: Independent  Sit to Supine: Independent  Scooting: Independent  Comment: HOB elevated, no use of rails for assist     Ambulation  Ambulation?: No        Exercises  Straight Leg Raise: supine with HOB elevated x 20 BL  Hamstring Sets: HS curls sitting at EOB x 25 with Red TB  Gluteal Sets: hold 5 sec x15  Hip Flexion: seated marches at EOB  x35  Hip Abduction: with red TB x20 sitting EOB  Knee Long Arc Quad: x25 BL sitting EOB  Ankle Pumps: x25 BL heel/toe raises seated EOB  Core Strengthening: Transverse abdom bracing in supine with HOB elevated 5 sec hold x15  Other exercises  Other exercises?: No         PM session:   S: Patient reports episode of SOB during OT earlier today. Currently pt denies any of SOB or change in back pain. Pt being seen bedside due to medical status. , Abhay Luevano, present in room. O:  Supine with HOB elevated ther ex x 25 BL heel/ toe raises, BL heel slide x25, hip abduction BL x20 (SpO2 91% follow hip abd)   With HOB slightly lowered, x 25 bridges, 3 sec hold x10 abd bracing (pt with SOB and needed cueing to stop exercise, SpO2 94%)   Pt and  educated on the function of hemoglobin and importance of taking transfer to bathroom slowly. Rhythmic timing LE with proximal to distal activation for strength and coordination. Pt with SOB and reminded to stop activities and taking breaths. Pt returned to normal breathing in ~1.5 min    A: In today's PT sessions, pt had to be seen bedside secondary to med status. Focused on maintaining of ROM and strength to decrease deconditioning while medically unstable. Pt with two instances of SOB during session and needed cued to stop the exercise and complete pursed lip breathing. SpO2 stayed above 90% throughout session. She denied any instances of dizziness. Safety Device - Type of devices:  [x]  All fall risk precautions in place [] Bed alarm in place  [x] Call light within reach [] Chair alarm in place [] Positioning belt [] Gait belt [x] Patient at risk for falls [x] Left in bed [] Left in chair [] Telesitter in use [] Sitter present [x] Nurse notified (RNTere) []  None   Goals  Short term goals  Time Frame for Short term goals:  In 5 days pt will perform  Short term goal 1: Bed mobility (I)  Short term goal 2: Transfers (I)  Short term goal 3: Ambulation 500' (I) without device  Short term goal 4: Ascend/Descend 15

## 2021-06-23 NOTE — PLAN OF CARE
Problem: Falls - Risk of:  Goal: Will remain free from falls  Description: Will remain free from falls  Outcome: Ongoing   Patient remained free of any falls this shift. Call light in reach at all times. Non skid footwear on. Patient encouraged to call for help when needed. Room free of clutter. Alarms on. Problem: Infection:  Goal: Will remain free from infection  Description: Will remain free from infection  Outcome: Ongoing     Problem: Safety:  Goal: Free from accidental physical injury  Description: Free from accidental physical injury  Outcome: Ongoing     Problem: Daily Care:  Goal: Daily care needs are met  Description: Daily care needs are met  Outcome: Ongoing     Problem: Pain:  Goal: Patient's pain/discomfort is manageable  Description: Patient's pain/discomfort is manageable  Outcome: Ongoing   Pain assessed using 0-10 scale. Offer PRN medication as ordered by MD. Marie Graf 30 minutes after giving. Problem: Discharge Planning:  Goal: Patients continuum of care needs are met  Description: Patients continuum of care needs are met  Outcome: Ongoing     Problem: Skin Integrity:  Goal: Skin integrity will stabilize  Description: Skin integrity will stabilize  Outcome: Ongoing   Patient is able to shift weight without assistance. Reposition every two hours. Skin assessed every shift. No new area of breakdown. Skin warm and dry to touch. Waffle cushion in chair.      Problem: ABCDS Injury Assessment  Goal: Absence of physical injury  Outcome: Ongoing

## 2021-06-23 NOTE — PROGRESS NOTES
Pt did not eat, blood transfusion to be given, pt refused insulin sliding scale and prandial, . Will continue to monitor.  Electronically signed by Ruth Kinsey RN on 6/23/2021 at 5:05 PM

## 2021-06-23 NOTE — PROGRESS NOTES
Department of Physical Medicine & Rehabilitation  Progress Note    Patient Identification:  Gisele Alston  [id number]  : 1955  Admit date: 2021    Chief Complaint: Upper GI bleed    Subjective:   Overnight patient had episode of shortness of breath and \"hot flash. \" She gets hot flashes at night chronically (\"for years\") but not typically associated with shortness of breath. Patient seen this afternoon sitting up in room. She reports ongoing intermittent shortness of breath, both with activity and at rest. She had some coughing overnight bit none today. Still no f/c. Has had couple of episodes of diarrhea today, green in color. Labs reviewed. ROS: No f/c, n/v, cp     Objective:  Patient Vitals for the past 24 hrs:   BP Temp Temp src Pulse Resp SpO2   21 1447 (!) 147/66 97.4 °F (36.3 °C) Oral 93 18 91 %   21 1352 138/74 98.1 °F (36.7 °C) Oral 85 18 90 %   21 0315 (!) 155/75 -- -- 91 -- 96 %   21 0130 (!) 190/69 98 °F (36.7 °C) Oral 93 16 93 %   21 2123 125/69 -- -- 91 -- --   21 1635 (!) 143/74 98.1 °F (36.7 °C) Oral 78 16 94 %     Const: Alert. No distress, pleasant. HEENT: Normocephalic, atraumatic. Normal sclera/conjunctiva. MMM. CV: Regular rate and rhythm. Resp: No respiratory distress. Lungs CTAB. Abd: Soft, nontender, nondistended, NABS+   Ext: +LE edema  Neuro: Alert, oriented, appropriately interactive. Psych: Cooperative, appropriate mood and affect    Laboratory data: Available via EMR.    Last 24 hour lab  Recent Results (from the past 24 hour(s))   POCT Glucose    Collection Time: 21  8:50 PM   Result Value Ref Range    POC Glucose 100 (H) 70 - 99 mg/dl    Performed on ACCU-CHEK    POCT Glucose    Collection Time: 21  2:07 AM   Result Value Ref Range    POC Glucose 246 (H) 70 - 99 mg/dl    Performed on ACCU-CHEK    CBC Auto Differential    Collection Time: 21  5:36 AM   Result Value Ref Range    WBC 18.0 (H) 4.0 - 11.0 K/uL    RBC 2.30 (L) 4.00 - 5.20 M/uL    Hemoglobin 6.9 (LL) 12.0 - 16.0 g/dL    Hematocrit 20.2 (LL) 36.0 - 48.0 %    MCV 87.7 80.0 - 100.0 fL    MCH 30.0 26.0 - 34.0 pg    MCHC 34.2 31.0 - 36.0 g/dL    RDW 14.9 12.4 - 15.4 %    Platelets 079 623 - 273 K/uL    MPV 6.2 5.0 - 10.5 fL    Neutrophils % 87.5 %    Lymphocytes % 6.6 %    Monocytes % 4.7 %    Eosinophils % 0.7 %    Basophils % 0.5 %    Neutrophils Absolute 15.7 (H) 1.7 - 7.7 K/uL    Lymphocytes Absolute 1.2 1.0 - 5.1 K/uL    Monocytes Absolute 0.8 0.0 - 1.3 K/uL    Eosinophils Absolute 0.1 0.0 - 0.6 K/uL    Basophils Absolute 0.1 0.0 - 0.2 K/uL   Basic Metabolic Panel    Collection Time: 06/23/21  5:36 AM   Result Value Ref Range    Sodium 139 136 - 145 mmol/L    Potassium 5.1 3.5 - 5.1 mmol/L    Chloride 106 99 - 110 mmol/L    CO2 20 (L) 21 - 32 mmol/L    Anion Gap 13 3 - 16    Glucose 302 (H) 70 - 99 mg/dL    BUN 48 (H) 7 - 20 mg/dL    CREATININE 1.9 (H) 0.6 - 1.2 mg/dL    GFR Non-African American 26 (A) >60    GFR  32 (A) >60    Calcium 8.0 (L) 8.3 - 10.6 mg/dL   Magnesium    Collection Time: 06/23/21  5:36 AM   Result Value Ref Range    Magnesium 1.90 1.80 - 2.40 mg/dL   TYPE AND SCREEN    Collection Time: 06/23/21  6:54 AM   Result Value Ref Range    ABO/Rh O POS     Antibody Screen NEG    PREPARE RBC (CROSSMATCH), 2 Units    Collection Time: 06/23/21  6:54 AM   Result Value Ref Range    Product Code Blood Bank [id number]     Description Blood Bank Red Blood Cells, Leuko-reduced     Unit Number [id number]     Dispense Status Blood Bank selected     Product Code Blood Bank [id number]     Description Blood Bank Red Blood Cells, Leuko-reduced     Unit Number [id number]     Dispense Status Blood Bank selected    POCT Glucose    Collection Time: 06/23/21  7:06 AM   Result Value Ref Range    POC Glucose 288 (H) 70 - 99 mg/dl    Performed on ACCU-CHEK    Hemoglobin    Collection Time: 06/23/21  9:31 AM   Result Value Ref Range    Hemoglobin 6.9 (LL) 12.0 - 16.0 g/dL   POCT Glucose    Collection Time: 06/23/21 11:24 AM   Result Value Ref Range    POC Glucose 220 (H) 70 - 99 mg/dl    Performed on ACCU-CHEK    POCT Glucose    Collection Time: 06/23/21  4:22 PM   Result Value Ref Range    POC Glucose 156 (H) 70 - 99 mg/dl    Performed on ACCU-CHEK        Therapy progress:  PT     Objective     Sit to Stand: Supervision  Stand to sit: Supervision  Bed to Chair: Supervision  Device: No Device  Assistance: Supervision  Distance: 550'; 30' x2; 150' x2  OT  PT Equipment Recommendations  Equipment Needed: No  Toilet - Technique: Ambulating  Equipment Used: Grab bars (used R rail only, as sink at home)  Toilet Transfers Comments: no device-- given permission to ambulate w/ pt for transfers in the room  Assessment        SLP          Body mass index is 23.94 kg/m². Rehabilitation Diagnosis:   16.0, Debility (non-cardiac, non-pulmonary        Assessment and Plan:     Impairments:  generalized weakness, decreased balance, endurance, spine ROM     Debility  -PT/OT     Upper GI bleed  -EGD with gastric ulcer, antral erosions  -NSAIDs dc'ed  -ppi     Acute blood loss anemia   -Due to GIB. -Monitor Hgb, transfuse prn <7. Decreased to 6.9, will transfuse 1 unit pRBC (6/23).    SILVESTRE  -Possibly AIN  -Avoid nephrotoxins, renally dose meds  -Monitor renal function. Cr stable ~2     Leukocytosis with eosinophilia  -Per [facility] Michael, possibly due to AIN  -WBC increased on admission labs, no longer with eosinophilia.  Work-up initiated:  ---CXR negative, procalcitonin elevated  ---Blood cultures negative  ---Lactate wnl  ---UA/culture positive, treating UTI as below    Pseudomonas UTI  -Sensitive to levofloxacin     Subacute T12 compression fracture  -Pain control with acetaminophen and lidoderm prn  -PT/OT    Shortness of breath  -Possibly related to anemia  -Check CT chest, TTE, EKG and place on telemetry     NSTEMI type II  -statin, bb     Diastolic

## 2021-06-23 NOTE — PROGRESS NOTES
OCCUPATIONAL THERAPY  Progress Note   Second Session    Patient Name: Ruchi Zuleta  Medical Record Number: [id number]         General  Chart Reviewed: Yes, Progress Notes  Patient assessed for rehabilitation services?: Yes  Additional Pertinent Hx: Patient is a 75 yo F with pmh DM2 and hypothyroidism who intitially presented to [facility] on 6/12/2021 with dark diarrhea, n/v, and progressive weakness. She did have a fall prior to presenting to the ED, fell backwards and did hit the back of her head. Found to have upper GI bleed. EGD revealed ulcer at GE junction, minimal gastric erosion in the antrum. She was started on ppi and Venofer. Of note, patient has been taking NSAIDs daily for 3 months due to back pain related to T12 compression fracture. Course was complicated by SILVESTRE, NSTEMI, DD2 on TTE. 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 Now presents to ARU with impaired mobility and self-care below her baseline. Currently, patient reports generalized weakness and fatigue. She has mild back pain. She denies abdominal pain, n/v, blood in stool. She is motivated to start inpatient rehab program.  Response to previous treatment: Patient with no complaints from previous session  Family / Caregiver Present: No  Referring Practitioner: Bianca Turcios MD  Diagnosis: Upper GI Bleed     Restrictions/Precautions  Restrictions/Precautions: Fall Risk             PM Session:  S: Pt met bedside per RN request d/t pt's current medical status. Pt denies pain, agreeable to participate in therapy.  present in room. Pt to get blood transfusion later today after midline placed. O:   Pt and her  asking about conference updates. S/OT and OT provided updates following conference and that discharge will depend on pt's current medical status. Pt discussed diabetes management and medication changes during hospitalization.  OT discussed endocrinologist and importance of managing diabetes following d/c. Transfers:   Pt completed supine >< sit w/ mod I. Pt able to sit at EOB w/ mod I. TherEx: Pt used a red theraband and was given a HEP handout with verbal and visual instructions. Pt instructed to stop exercises if she experiences pain and to complete w/ family present. Pt completed 15 reps of the following exercises to increase strength and ROM in BUEs for independence in ADL/IADL tasks: shoulder flex/ext, chest press, tricep ext/flex, bicep flex/ext, horizontal IR/ER, and scapular protraction/retraction. During exercises, pt visibly SOB. OT checked oxygen, reading at 90% while seated EOB. Endurance:   Pt stood at table top w/ supervision to complete PVC pipe task. Pt able to stand for 5 minutes to complete activity. Did not require additional cues for accuracy or thoroughness. Assessment: Pt and OT engaged in conversation regarding pt's current d/c plan, medication management, and importance of endocrinologist to assist in managing pt's diabetes. Pt completed therex w/ red theraband while seated EOB to increase strength in BUEs. Pt stood at tabletop w/ supervision for 5 minutes to complete endurance activity of PVC piping, able to complete without additional cueing or sequencing. Pt continues to progress in therapy, but current medical status may affect d/c. Plan to cont per POC. Pt left in bed following OT session with all needs met. Pt allowed to transfer w/  present, so bed exit not turned on.      Safety Device - Type of devices:  []  All fall risk precautions in place [] Bed alarm in place  [x] Call light within reach [] Chair alarm in place [] Positioning belt [] Gait belt [] Patient at risk for falls [x] Left in bed [] Left in chair [] Telesitter in use [] Sitter present [] Nurse notified []  None    Therapy Time     Individual Co-treatment   Time In 1300     Time Out 1345     Minutes 45        Electronically signed by Ezio Michaels on 6/23/2021 at 1:45 PM    Therapist was present, directed patients care, made skilled judgement, and was responsible for assessment and treatment of the patient.       Joycelyn Jimenez, OTR/L #[id number]

## 2021-06-23 NOTE — PROGRESS NOTES
Call in to Dr. Katie Combs, Hemoglobin 6.9, K+ 5.1, requests a recheck and hold transfusion until results.  Electronically signed by Lenard Emanuel RN on 6/23/2021 at 8:32 AM

## 2021-06-23 NOTE — PROGRESS NOTES
Occupational Therapy  Facility/Department: [address] IP REHAB  Daily Treatment Note  NAME: Rodney Harrell  : 1955  MRN: [id number]    Date of Service: 2021    Discharge Recommendations:  Home with assist PRN  OT Equipment Recommendations  Other: pt denies need for shower chair despite being educated on benefits & reducing fall risk & conserving energy    Assessment   Performance deficits / Impairments: Decreased functional mobility ; Decreased endurance;Decreased ADL status; Decreased high-level IADLs;Decreased balance  Assessment: Pt completed all functional mob/transfers w/ supervision and no device this date. RN asked OT to see pt bedside--she is waiting on blood transfusion d/t low H&H. Pt completed exercises w/ 2 lb wts  to increase strength for ADL/IADL tasks. She completed bed mobility IND'ly and actually crawled in on her knees. Pt completed room & bathrm mobility w/ Supervision-- & daughter present & allowed to transfer this pt. Discussed diabetes management as pt's blood sugars have \"been all over the place\" per pt's report. Educated on eating 5 smaller meals & eating something w/ protein prior to bedtime to avoid drop in BS. Also discussed getting the [facility] continous glucose monitoring device which uses smart phone technology.  Pt progressing well in therapy, plan to cont per POC  Treatment Diagnosis: decreased endurance, ADLs, balance, IADLs  Prognosis: Good  Decision Making: Low Complexity  History: see chart, upper GI bleed  Exam: UE strengthening, bed mob t/f & fxl mob  Assistance / Modification: IND w/ bed mob, Supervision w/ transfer & fxl mob no AD  OT Education: Family Education  Patient Education: educated pt & family on diabetes management, they are inquiring about the [employer] continous glucose monitoring device; also discussed diet and consequences of sugars running too high or low  REQUIRES OT FOLLOW UP: Yes  Activity Tolerance  Activity Tolerance: Patient Tolerated treatment well  Activity Tolerance: pt is pleasant & cooperative  Safety Devices  Safety Devices in place: Yes  Type of devices: Call light within reach; Left in bed;Gait belt (family present in room & allowed to transfer pt)         Patient Diagnosis(es): There were no encounter diagnoses. has a past medical history of CAD (coronary artery disease), Diabetes mellitus ([facility] [address] Hyperlipidemia, Shingles, and Thyroid disease. has a past surgical history that includes eye surgery; sinus surgery; and Cholecystectomy. Restrictions  Restrictions/Precautions  Restrictions/Precautions: Fall Risk  Subjective   General  Chart Reviewed: Yes, Progress Notes  Patient assessed for rehabilitation services?: Yes  Additional Pertinent Hx: Patient is a 75 yo F with pmh DM2 and hypothyroidism who intitially presented to [facility] on 6/12/2021 with dark diarrhea, n/v, and progressive weakness. She did have a fall prior to presenting to the ED, fell backwards and did hit the back of her head. Found to have upper GI bleed. EGD revealed ulcer at GE junction, minimal gastric erosion in the antrum. She was started on ppi and Venofer. Of note, patient has been taking NSAIDs daily for 3 months due to back pain related to T12 compression fracture. Course was complicated by SILVESTRE, NSTEMI, DD2 on TTE. 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 Now presents to ARU with impaired mobility and self-care below her baseline. Currently, patient reports generalized weakness and fatigue. She has mild back pain. She denies abdominal pain, n/v, blood in stool.  She is motivated to start inpatient rehab program.  Response to previous treatment: Patient with no complaints from previous session  Family / Caregiver Present: No  Referring Practitioner: Cori Goldman MD  Diagnosis: Upper GI Bleed  Subjective  Subjective: met in room, pt in bed but denies pain or dizziness; pt awaiting blood transfuion d/t low H&H  General Comment  Comments: agreeable for bed mob, transfer, toileting & UE strengthening      Orientation  Orientation  Overall Orientation Status: Within Functional Limits  Orientation Level: Oriented X4  Objective       Instrumental ADL's  Instrumental ADLs: Yes  Health Management  Health Management Level:  Other (discussed at length w/ pt & her familly about diabetes management --eating @ 5 smaller meals and eating something w/ protein before bedtime; family is wanting her to get the continous glucose monitor (Eva) in her arm & monitored w/ smart phone)     Balance  Sitting Balance: Modified independent   Standing Balance: Supervision  Standing Balance  Time: untimed  Activity: during fxl mob  Comment: no LOB, did not use AD  Functional Mobility  Functional - Mobility Device: No device  Activity: To/from bathroom  Assist Level: Supervision  Functional Mobility Comments: pt ambulated without device w/ supervision around her room & to/from bathrm  Toilet Transfers  Toilet - Technique: Ambulating  Toilet Transfer: Supervision  Toilet Transfers Comments: no device-- given permission to ambulate w/ pt for transfers in the room  Wheelchair Bed Transfers  Wheelchair/Bed - Technique: Ambulating  Equipment Used: Bed  Level of Asssistance: Supervision  Wheelchair Transfers Comments: no AD, able to pause upon transitional movmts (has h/o falls & pt reports being \"clumsy\")  Bed mobility  Rolling to Left: Independent  Rolling to Right: Independent  Supine to Sit: Independent  Sit to Supine: Independent  Comment: climbs into bed on her knees; does not use rail  Transfers  Sit to stand: Supervision  Stand to sit: Supervision  Transfer Comments: able to pause upon supine to sit on EOB prior to completing transfer                       Cognition  Overall Cognitive Status: WNL  Cognition Comment: seems to lack insight into her diabetes management                    Type of ROM/Therapeutic Exercise  Type of ROM/Therapeutic

## 2021-06-24 ENCOUNTER — APPOINTMENT (OUTPATIENT)
Dept: ULTRASOUND IMAGING | Age: 66
DRG: 947 | End: 2021-06-24
Attending: PHYSICAL MEDICINE & REHABILITATION
Payer: MEDICARE

## 2021-06-24 LAB
ANION GAP SERPL CALCULATED.3IONS-SCNC: 18 MMOL/L (ref 3–16)
BASOPHILS ABSOLUTE: 0.1 K/UL (ref 0–0.2)
BASOPHILS RELATIVE PERCENT: 0.6 %
BUN BLDV-MCNC: 45 MG/DL (ref 7–20)
CALCIUM SERPL-MCNC: 8.1 MG/DL (ref 8.3–10.6)
CHLORIDE BLD-SCNC: 103 MMOL/L (ref 99–110)
CO2: 16 MMOL/L (ref 21–32)
CREAT SERPL-MCNC: 2.2 MG/DL (ref 0.6–1.2)
CREATININE URINE: 70 MG/DL (ref 28–259)
EKG ATRIAL RATE: 94 BPM
EKG DIAGNOSIS: NORMAL
EKG P AXIS: 36 DEGREES
EKG P-R INTERVAL: 130 MS
EKG Q-T INTERVAL: 382 MS
EKG QRS DURATION: 100 MS
EKG QTC CALCULATION (BAZETT): 477 MS
EKG R AXIS: 7 DEGREES
EKG T AXIS: 79 DEGREES
EKG VENTRICULAR RATE: 94 BPM
EOSINOPHIL,URINE: NORMAL
EOSINOPHILS ABSOLUTE: 0.1 K/UL (ref 0–0.6)
EOSINOPHILS RELATIVE PERCENT: 0.6 %
GFR AFRICAN AMERICAN: 27
GFR NON-AFRICAN AMERICAN: 22
GLUCOSE BLD-MCNC: 155 MG/DL (ref 70–99)
GLUCOSE BLD-MCNC: 263 MG/DL (ref 70–99)
GLUCOSE BLD-MCNC: 309 MG/DL (ref 70–99)
GLUCOSE BLD-MCNC: 310 MG/DL (ref 70–99)
GLUCOSE BLD-MCNC: 380 MG/DL (ref 70–99)
HCT VFR BLD CALC: 23 % (ref 36–48)
HCT VFR BLD CALC: 24.3 % (ref 36–48)
HEMOGLOBIN: 7.7 G/DL (ref 12–16)
HEMOGLOBIN: 8.2 G/DL (ref 12–16)
LYMPHOCYTES ABSOLUTE: 0.9 K/UL (ref 1–5.1)
LYMPHOCYTES RELATIVE PERCENT: 5.1 %
MAGNESIUM: 2 MG/DL (ref 1.8–2.4)
MCH RBC QN AUTO: 29.7 PG (ref 26–34)
MCHC RBC AUTO-ENTMCNC: 33.8 G/DL (ref 31–36)
MCV RBC AUTO: 87.9 FL (ref 80–100)
MONOCYTES ABSOLUTE: 0.6 K/UL (ref 0–1.3)
MONOCYTES RELATIVE PERCENT: 3.7 %
NEUTROPHILS ABSOLUTE: 15.5 K/UL (ref 1.7–7.7)
NEUTROPHILS RELATIVE PERCENT: 90 %
PDW BLD-RTO: 14.6 % (ref 12.4–15.4)
PERFORMED ON: ABNORMAL
PLATELET # BLD: 372 K/UL (ref 135–450)
PMV BLD AUTO: 6.3 FL (ref 5–10.5)
POTASSIUM SERPL-SCNC: 4.4 MMOL/L (ref 3.5–5.1)
PROCALCITONIN: 0.48 NG/ML (ref 0–0.15)
PROTEIN PROTEIN: 296 MG/DL
RBC # BLD: 2.76 M/UL (ref 4–5.2)
SARS-COV-2, NAAT: NOT DETECTED
SODIUM BLD-SCNC: 137 MMOL/L (ref 136–145)
SODIUM URINE: 29 MMOL/L
UREA NITROGEN, UR: 510.3 MG/DL (ref 800–1666)
VANCOMYCIN RANDOM: 12.1 UG/ML
WBC # BLD: 17.2 K/UL (ref 4–11)

## 2021-06-24 PROCEDURE — 36415 COLL VENOUS BLD VENIPUNCTURE: CPT

## 2021-06-24 PROCEDURE — 97112 NEUROMUSCULAR REEDUCATION: CPT

## 2021-06-24 PROCEDURE — 97116 GAIT TRAINING THERAPY: CPT

## 2021-06-24 PROCEDURE — 76770 US EXAM ABDO BACK WALL COMP: CPT

## 2021-06-24 PROCEDURE — 6360000002 HC RX W HCPCS: Performed by: PHYSICAL MEDICINE & REHABILITATION

## 2021-06-24 PROCEDURE — 6370000000 HC RX 637 (ALT 250 FOR IP): Performed by: PHYSICAL MEDICINE & REHABILITATION

## 2021-06-24 PROCEDURE — 93308 TTE F-UP OR LMTD: CPT

## 2021-06-24 PROCEDURE — 85025 COMPLETE CBC W/AUTO DIFF WBC: CPT

## 2021-06-24 PROCEDURE — 83735 ASSAY OF MAGNESIUM: CPT

## 2021-06-24 PROCEDURE — 1280000000 HC REHAB R&B

## 2021-06-24 PROCEDURE — 87205 SMEAR GRAM STAIN: CPT

## 2021-06-24 PROCEDURE — 97110 THERAPEUTIC EXERCISES: CPT

## 2021-06-24 PROCEDURE — 97530 THERAPEUTIC ACTIVITIES: CPT

## 2021-06-24 PROCEDURE — 97535 SELF CARE MNGMENT TRAINING: CPT

## 2021-06-24 PROCEDURE — 84145 PROCALCITONIN (PCT): CPT

## 2021-06-24 PROCEDURE — 85014 HEMATOCRIT: CPT

## 2021-06-24 PROCEDURE — 2580000003 HC RX 258: Performed by: PHYSICAL MEDICINE & REHABILITATION

## 2021-06-24 PROCEDURE — 93010 ELECTROCARDIOGRAM REPORT: CPT | Performed by: INTERNAL MEDICINE

## 2021-06-24 PROCEDURE — 93321 DOPPLER ECHO F-UP/LMTD STD: CPT

## 2021-06-24 PROCEDURE — 84300 ASSAY OF URINE SODIUM: CPT

## 2021-06-24 PROCEDURE — 87641 MR-STAPH DNA AMP PROBE: CPT

## 2021-06-24 PROCEDURE — 82570 ASSAY OF URINE CREATININE: CPT

## 2021-06-24 PROCEDURE — 80048 BASIC METABOLIC PNL TOTAL CA: CPT

## 2021-06-24 PROCEDURE — 93325 DOPPLER ECHO COLOR FLOW MAPG: CPT

## 2021-06-24 PROCEDURE — 85018 HEMOGLOBIN: CPT

## 2021-06-24 PROCEDURE — 87635 SARS-COV-2 COVID-19 AMP PRB: CPT

## 2021-06-24 PROCEDURE — 84156 ASSAY OF PROTEIN URINE: CPT

## 2021-06-24 PROCEDURE — 84540 ASSAY OF URINE/UREA-N: CPT

## 2021-06-24 PROCEDURE — 80202 ASSAY OF VANCOMYCIN: CPT

## 2021-06-24 RX ADMIN — INSULIN LISPRO 5 UNITS: 100 INJECTION, SOLUTION INTRAVENOUS; SUBCUTANEOUS at 16:52

## 2021-06-24 RX ADMIN — METOPROLOL TARTRATE 50 MG: 50 TABLET, FILM COATED ORAL at 20:42

## 2021-06-24 RX ADMIN — SODIUM CHLORIDE, PRESERVATIVE FREE 10 ML: 5 INJECTION INTRAVENOUS at 20:46

## 2021-06-24 RX ADMIN — INSULIN LISPRO 4 UNITS: 100 INJECTION, SOLUTION INTRAVENOUS; SUBCUTANEOUS at 08:37

## 2021-06-24 RX ADMIN — CEFEPIME HYDROCHLORIDE 1000 MG: 1 INJECTION, POWDER, FOR SOLUTION INTRAMUSCULAR; INTRAVENOUS at 11:40

## 2021-06-24 RX ADMIN — CEFEPIME HYDROCHLORIDE 1000 MG: 1 INJECTION, POWDER, FOR SOLUTION INTRAMUSCULAR; INTRAVENOUS at 22:57

## 2021-06-24 RX ADMIN — INSULIN LISPRO 4 UNITS: 100 INJECTION, SOLUTION INTRAVENOUS; SUBCUTANEOUS at 12:11

## 2021-06-24 RX ADMIN — DOCUSATE SODIUM 50 MG AND SENNOSIDES 8.6 MG 1 TABLET: 8.6; 5 TABLET, FILM COATED ORAL at 20:42

## 2021-06-24 RX ADMIN — LEVOTHYROXINE SODIUM 125 MCG: 0.12 TABLET ORAL at 05:46

## 2021-06-24 RX ADMIN — CEFEPIME HYDROCHLORIDE 1000 MG: 1 INJECTION, POWDER, FOR SOLUTION INTRAMUSCULAR; INTRAVENOUS at 00:29

## 2021-06-24 RX ADMIN — INSULIN LISPRO 5 UNITS: 100 INJECTION, SOLUTION INTRAVENOUS; SUBCUTANEOUS at 08:38

## 2021-06-24 RX ADMIN — NIFEDIPINE 90 MG: 90 TABLET, FILM COATED, EXTENDED RELEASE ORAL at 08:37

## 2021-06-24 RX ADMIN — INSULIN LISPRO 1 UNITS: 100 INJECTION, SOLUTION INTRAVENOUS; SUBCUTANEOUS at 16:52

## 2021-06-24 RX ADMIN — ATORVASTATIN CALCIUM 40 MG: 40 TABLET, FILM COATED ORAL at 20:42

## 2021-06-24 RX ADMIN — SODIUM CHLORIDE, PRESERVATIVE FREE 10 ML: 5 INJECTION INTRAVENOUS at 08:43

## 2021-06-24 RX ADMIN — METOPROLOL TARTRATE 50 MG: 50 TABLET, FILM COATED ORAL at 08:37

## 2021-06-24 RX ADMIN — VANCOMYCIN HYDROCHLORIDE 1000 MG: 1 INJECTION, POWDER, LYOPHILIZED, FOR SOLUTION INTRAVENOUS at 16:50

## 2021-06-24 RX ADMIN — INSULIN LISPRO 2 UNITS: 100 INJECTION, SOLUTION INTRAVENOUS; SUBCUTANEOUS at 20:43

## 2021-06-24 RX ADMIN — INSULIN LISPRO 5 UNITS: 100 INJECTION, SOLUTION INTRAVENOUS; SUBCUTANEOUS at 12:11

## 2021-06-24 RX ADMIN — PANTOPRAZOLE SODIUM 40 MG: 40 TABLET, DELAYED RELEASE ORAL at 05:46

## 2021-06-24 RX ADMIN — DOCUSATE SODIUM 50 MG AND SENNOSIDES 8.6 MG 1 TABLET: 8.6; 5 TABLET, FILM COATED ORAL at 08:37

## 2021-06-24 RX ADMIN — INSULIN GLARGINE 7 UNITS: 100 INJECTION, SOLUTION SUBCUTANEOUS at 20:43

## 2021-06-24 ASSESSMENT — PAIN SCALES - GENERAL
PAINLEVEL_OUTOF10: 0

## 2021-06-24 NOTE — CONSULTS
Clinical Pharmacy Note  Vancomycin Consult    Juve Carroll is a 74 y.o. female ordered Vancomycin for Pneumonia (HAP)/UTI; consult received from Dr. Jamir Pearl to manage therapy. Also receiving Maxipime 1 gram every 12 hours. Patient Active Problem List   Diagnosis    Upper GI bleed       Allergies:  Lisinopril and Metformin and related     Temp max:  Temp (24hrs), Av.9 °F (36.6 °C), Min:97.4 °F (36.3 °C), Max:98.3 °F (36.8 °C)      Recent Labs     21  0653 21  0708 21  0536   WBC 16.5* 17.4* 18.0*       Recent Labs     21  0653 21  0708 21  0536   BUN 37* 43* 48*   CREATININE 1.9* 1.8* 1.9*         Intake/Output Summary (Last 24 hours) at 2021 2108  Last data filed at 2021 1138  Gross per 24 hour   Intake 480 ml   Output --   Net 480 ml       Culture Results:      Ht Readings from Last 1 Encounters:   21 5' 3\" (1.6 m)        Wt Readings from Last 1 Encounters:   21 135 lb 2.3 oz (61.3 kg)         Estimated Creatinine Clearance: 23 mL/min (A) (based on SCr of 1.9 mg/dL (H)). Assessment/Plan:  Dx: pneumonia (HAP)/UTI  SrCR = 1.9   CrCl = 23  Vancomycin 1000 mg times one dose. Followed by a Vancomycin Random level at 0600  21. Goal trough level 15-20 mg/L. Thank you for the consult.      [address] [facility], PRS 2021  9:11 PM

## 2021-06-24 NOTE — PROGRESS NOTES
Occupational Therapy  Facility/Department: [facility] IP REHAB  Attempt Note    NAME: Carolina Markham  : 1955  MRN: [id number]     Date of Service: 2021      OT to room for tx but pt gone to echo. Will re-schedule as able.       Electronically signed by Haider Mcrae OTR/L  License # [id number]    45 minute variance D/T medical test, will attempt to re-schedule

## 2021-06-24 NOTE — PLAN OF CARE
Problem: Falls - Risk of:  Goal: Will remain free from falls  Description: Will remain free from falls  6/23/2021 2325 by Calli Chinchilla RN  Outcome: Ongoing   Patient remained free of any falls this shift. Call light in reach at all times. Non skid footwear on. Patient encouraged to call for help when needed. Room free of clutter. Alarms on. Problem: Infection:  Goal: Will remain free from infection  Description: Will remain free from infection  6/23/2021 2325 by Calli Chinchilla RN  Outcome: Ongoing     Problem: Safety:  Goal: Free from accidental physical injury  Description: Free from accidental physical injury  6/23/2021 2325 by Calli Chinchilla RN  Outcome: Ongoing     Problem: Daily Care:  Goal: Daily care needs are met  Description: Daily care needs are met  6/23/2021 2325 by Calli Chinchilla RN  Outcome: Ongoing     Problem: Pain:  Goal: Patient's pain/discomfort is manageable  Description: Patient's pain/discomfort is manageable  6/23/2021 2325 by Calli Chinchilla RN  Outcome: Ongoing   Pain assessed using 0-10 scale. Offer PRN medication as ordered by MD. William Cabot 30 minutes after giving. Problem: Skin Integrity:  Goal: Skin integrity will stabilize  Description: Skin integrity will stabilize  6/23/2021 0949 by Jorge Metz RN  Outcome: Ongoing   Patient is able to shift weight without assistance. Reposition every two hours. Skin assessed every shift. No new area of breakdown. Skin warm and dry to touch. Waffle cushion in chair.      Problem: Discharge Planning:  Goal: Patients continuum of care needs are met  Description: Patients continuum of care needs are met  6/23/2021 2325 by Calli Chinchilla RN  Outcome: Ongoing     Problem: Metabolic:  Goal: Ability to maintain appropriate glucose levels will improve  Description: Ability to maintain appropriate glucose levels will improve  6/23/2021 2325 by Calli Chinchilla RN  Outcome: Ongoing

## 2021-06-24 NOTE — PROGRESS NOTES
Department of Physical Medicine & Rehabilitation  Progress Note    Patient Identification:  Chetan Reveles  [id number]  : 1955  Admit date: 2021    Chief Complaint: Upper GI bleed    Subjective:   No acute events overnight. Started vanc+cefepime due to CT chest concerning for pneumonia. Patient seen this am sitting up in room. She reports ongoing shortness of breath. Today describes more as air getting caught in her throat. She does have globus sensation and hoarse voice progressing over the past 1-2 days. Labs reviewed. ROS: No f/c, n/v, cp     Objective:  Patient Vitals for the past 24 hrs:   BP Temp Temp src Pulse Resp SpO2 Weight   21 0830 (!) 158/82 98.5 °F (36.9 °C) Oral 76 18 93 % --   21 0544 (!) 157/78 98.4 °F (36.9 °C) Oral 93 18 92 % 135 lb 2.3 oz (61.3 kg)   210 136/75 98.6 °F (37 °C) Oral 76 18 90 % --   21 128/75 98.1 °F (36.7 °C) Oral 71 18 90 % --   214 116/67 98.1 °F (36.7 °C) Oral 92 18 90 % --   21 122/67 98.3 °F (36.8 °C) Oral 91 18 90 % --   21 1945 128/66 97.6 °F (36.4 °C) Oral 103 20 90 % --   21 1648 -- -- -- -- -- 91 % --   21 1530 -- -- -- -- -- 94 % --   21 1529 132/75 97.9 °F (36.6 °C) Oral 96 18 (!) 86 % --   21 1447 (!) 147/66 97.4 °F (36.3 °C) Oral 93 18 91 % --   21 1352 138/74 98.1 °F (36.7 °C) Oral 85 18 90 % --     Const: Alert. No distress, pleasant. HEENT: Normocephalic, atraumatic. Normal sclera/conjunctiva. MMM. CV: Regular rate and rhythm. Resp: No respiratory distress. Lungs CTAB. Abd: Soft, nontender, nondistended, NABS+   Ext: +LE edema  Neuro: Alert, oriented, appropriately interactive. Psych: Cooperative, appropriate mood and affect    Laboratory data: Available via EMR.    Last 24 hour lab  Recent Results (from the past 24 hour(s))   POCT Glucose    Collection Time: 21  4:22 PM   Result Value Ref Range    POC Glucose 156 (H) 70 - 99 mg/dl Performed on ACCU-CHEK    EKG 12 Lead    Collection Time: 06/23/21  4:46 PM   Result Value Ref Range    Ventricular Rate 94 BPM    Atrial Rate 94 BPM    P-R Interval 130 ms    QRS Duration 100 ms    Q-T Interval 382 ms    QTc Calculation (Bazett) 477 ms    P Axis 36 degrees    R Axis 7 degrees    T Axis 79 degrees    Diagnosis       Normal sinus rhythmPossible Left atrial enlargementST abnormality, possible digitalis effectAbnormal ECGNo previous ECGs available   POCT Glucose    Collection Time: 06/23/21  8:17 PM   Result Value Ref Range    POC Glucose 288 (H) 70 - 99 mg/dl    Performed on ACCU-CHEK    COVID-19, Rapid    Collection Time: 06/24/21 12:00 AM   Result Value Ref Range    SARS-CoV-2, NAAT Not Detected Not Detected   Hemoglobin and Hematocrit, Blood    Collection Time: 06/24/21  2:06 AM   Result Value Ref Range    Hemoglobin 7.7 (L) 12.0 - 16.0 g/dL    Hematocrit 23.0 (L) 36.0 - 48.0 %   POCT Glucose    Collection Time: 06/24/21  7:05 AM   Result Value Ref Range    POC Glucose 309 (H) 70 - 99 mg/dl    Performed on ACCU-CHEK    CBC Auto Differential    Collection Time: 06/24/21  8:37 AM   Result Value Ref Range    WBC 17.2 (H) 4.0 - 11.0 K/uL    RBC 2.76 (L) 4.00 - 5.20 M/uL    Hemoglobin 8.2 (L) 12.0 - 16.0 g/dL    Hematocrit 24.3 (L) 36.0 - 48.0 %    MCV 87.9 80.0 - 100.0 fL    MCH 29.7 26.0 - 34.0 pg    MCHC 33.8 31.0 - 36.0 g/dL    RDW 14.6 12.4 - 15.4 %    Platelets 010 733 - 494 K/uL    MPV 6.3 5.0 - 10.5 fL    Neutrophils % 90.0 %    Lymphocytes % 5.1 %    Monocytes % 3.7 %    Eosinophils % 0.6 %    Basophils % 0.6 %    Neutrophils Absolute 15.5 (H) 1.7 - 7.7 K/uL    Lymphocytes Absolute 0.9 (L) 1.0 - 5.1 K/uL    Monocytes Absolute 0.6 0.0 - 1.3 K/uL    Eosinophils Absolute 0.1 0.0 - 0.6 K/uL    Basophils Absolute 0.1 0.0 - 0.2 K/uL   Basic Metabolic Panel    Collection Time: 06/24/21  8:37 AM   Result Value Ref Range    Sodium 137 136 - 145 mmol/L    Potassium 4.4 3.5 - 5.1 mmol/L    Chloride 103 99 - 110 mmol/L    CO2 16 (L) 21 - 32 mmol/L    Anion Gap 18 (H) 3 - 16    Glucose 380 (H) 70 - 99 mg/dL    BUN 45 (H) 7 - 20 mg/dL    CREATININE 2.2 (H) 0.6 - 1.2 mg/dL    GFR Non-African American 22 (A) >60    GFR  27 (A) >60    Calcium 8.1 (L) 8.3 - 10.6 mg/dL   Magnesium    Collection Time: 06/24/21  8:37 AM   Result Value Ref Range    Magnesium 2.00 1.80 - 2.40 mg/dL   Procalcitonin    Collection Time: 06/24/21  8:37 AM   Result Value Ref Range    Procalcitonin 0.48 (H) 0.00 - 0.15 ng/mL   Vancomycin, Random    Collection Time: 06/24/21  8:37 AM   Result Value Ref Range    Vancomycin Rm 12.1 ug/mL       Therapy progress:  PT     Objective     Sit to Stand: Independent  Stand to sit: Independent  Bed to Chair: Supervision  Device: No Device  Assistance: Stand by assistance (for safety)  Distance: 250';  OT  PT Equipment Recommendations  Equipment Needed: No  Toilet - Technique: Ambulating  Equipment Used: Grab bars  Toilet Transfers Comments: no device, did not sure grab bars around toilet  Assessment        SLP          Body mass index is 23.94 kg/m². Rehabilitation Diagnosis:   16.0, Debility (non-cardiac, non-pulmonary        Assessment and Plan:     Impairments:  generalized weakness, decreased balance, endurance, spine ROM     Debility  -PT/OT     Upper GI bleed  -EGD with gastric ulcer, antral erosions  -NSAIDs dc'ed  -ppi     Acute blood loss anemia   -Due to GIB. -Monitor Hgb, transfuse prn <7. Responded appropriately to 1 unit pRBCs on 6/23.      SILEVSTRE  -Possibly AIN per [facility]  -Avoid nephrotoxins, renally dose meds  -Monitor renal function. Cr hovering ~2. Will ask Nephrology follow.      Leukocytosis with eosinophilia  -Per [facility], possibly due to AIN  -WBC remained increased on admission to ARU, no longer with eosinophilia.  Work-up initiated:  ---CXR negative, procalcitonin elevated  ---Blood cultures negative  ---Lactate wnl  ---UA/culture positive, treating UTI as below  ---Repeat work-up ordered 6/23 due to new onset hypoxia and shortness of breath. CT chest concerning for pneumonia. HAP  -Started vanc+ cefepime (6/23)  -MRSA swab pending  -Covid negative    Globus sensation, dysphonia  -ENT consult    Pseudomonas UTI  -Initially on levofloxacin --> broadened coverage to cefepime due to pneumonia, this will also cover UTI.      Subacute T12 compression fracture  -Pain control with acetaminophen and lidoderm prn  -PT/OT    NSTEMI type II  -statin, bb     Diastolic dysfunction  -Daily wt     HTN  -metoprolol (increased dose), nifedipine (increased dose) -- trend improved     DM2  -Lantus 12 qhs, prandial 5 TID, ISS     Hypothyroidism  -levothyroxine     Bladder   -High risk retention   -Monitor PVRs, SC prn >300cc     Bowel   -High risk constipation   -senna+colace BID, PRN miralax, MoM, and bisacodyl supp.     Safety   -fall precautions     PPx  -DVT: Hold due to bleed risk  -GI: pantoprazole       Rehab Progress: Making progress. Working on strength, endurance, balance. Anticipated Dispo: home with   Services: outpatient PT  DME: shower chair  ELOS: 6/26 pending medical readiness      [address]  Ling Sullivan MD 6/24/2021, 11:39 AM

## 2021-06-24 NOTE — PLAN OF CARE
Problem: Falls - Risk of:  Goal: Will remain free from falls  Description: Will remain free from falls  6/24/2021 1007 by Mike Oshea RN  Outcome: Ongoing  Note: Assessment completed. Fall precautions in place. Uses CGA for transfers. Fall precautions in place. Non skid footwear and armband on. Bed/chair alarms in place and functioning. Personal items and call light within reach. Monitored frequently. 6/23/2021 2325 by Kristy Cantor RN  Outcome: Ongoing  Goal: Absence of physical injury  Description: Absence of physical injury  Outcome: Ongoing     Problem: Infection:  Goal: Will remain free from infection  Description: Will remain free from infection  6/23/2021 2325 by Kristy Cantor RN  Outcome: Ongoing     Problem: Safety:  Goal: Free from accidental physical injury  Description: Free from accidental physical injury  6/23/2021 2325 by Kristy Cantor RN  Outcome: Ongoing     Problem: Daily Care:  Goal: Daily care needs are met  Description: Daily care needs are met  6/23/2021 2325 by Kristy Cantor RN  Outcome: Ongoing     Problem: Pain:  Goal: Patient's pain/discomfort is manageable  Description: Patient's pain/discomfort is manageable  6/23/2021 2325 by Kristy Cantor RN  Outcome: Ongoing     Problem: Skin Integrity:  Goal: Skin integrity will stabilize  Description: Skin integrity will stabilize  Outcome: Ongoing  Note: Assessment completed. No sign or symptoms of infection noted. Will continue to monitor.       Problem: Discharge Planning:  Goal: Patients continuum of care needs are met  Description: Patients continuum of care needs are met  6/23/2021 2325 by Kristy Cantor RN  Outcome: Ongoing     Problem: Metabolic:  Goal: Ability to maintain appropriate glucose levels will improve  Description: Ability to maintain appropriate glucose levels will improve  6/23/2021 2325 by Kristy Cantor RN  Outcome: Ongoing

## 2021-06-24 NOTE — PROGRESS NOTES
Occupational Therapy  Facility/Department: [address] IP REHAB  Daily Treatment Note  AM and PM Sessions:    NAME: Roberto Pierre  : 1955  MRN: [id number]    Date of Service: 2021    Discharge Recommendations:  Home with assist PRN  OT Equipment Recommendations  Other: pt denies need for shower chair despite being educated on benefits & reducing fall risk & conserving energy    Assessment   Performance deficits / Impairments: Decreased functional mobility ; Decreased endurance;Decreased ADL status; Decreased high-level IADLs;Decreased balance  Assessment: Pt completed all functional mob/transfers w/ mod I and no device this AM w/ no LOB during ADL session. Pt complete bathing w/ set up to wrap PICC site, mod I for all other aspects of bathing in standing. Pt completed UE dressing w/ mod I standing at the sink and LB dressing mod I standing at the sink. Pt performed grooming tasks while standing at sink. Pt on track to d/c tomorrow, 21 w/ assist prn. Plan to cont per POC  Treatment Diagnosis: decreased endurance, ADLs, balance, IADLs  Prognosis: Good  REQUIRES OT FOLLOW UP: Yes  Safety Devices  Safety Devices in place: Yes  Type of devices: Gait belt;Left in chair;Chair alarm in place;Call light within reach; Patient at risk for falls;Nurse notified         Patient Diagnosis(es): There were no encounter diagnoses. has a past medical history of CAD (coronary artery disease), Diabetes mellitus ([facility] [address] Hyperlipidemia, Shingles, and Thyroid disease. has a past surgical history that includes eye surgery; sinus surgery; and Cholecystectomy. Restrictions  Restrictions/Precautions  Restrictions/Precautions: Fall Risk  Subjective   General  Chart Reviewed: Yes, Progress Notes  Patient assessed for rehabilitation services?: Yes  Additional Pertinent Hx: Patient is a 73 yo F with pmh DM2 and hypothyroidism who intitially presented to [facility] on 2021 with dark diarrhea, n/v, and progressive weakness. device  Activity: To/from bathroom  Assist Level: Modified independent   Functional Mobility Comments: pt ambulated without device w/ mod Iaround her room & to/from bathrm  Toilet Transfers  Toilet - Technique: Ambulating  Equipment Used: Grab bars  Toilet Transfer: Modified independent  Toilet Transfers Comments: no device, did not sure grab bars around toilet  Shower Transfers  Shower - Transfer From: Other (no device)  Shower - Transfer Type: To and From  Shower - Transfer To: Standing  Shower - Technique: Ambulating  Shower Transfers: Modified independence  Shower Transfers Comments: Pt able to perform shower transfer w/ mod I and no support from walls or chair  Wheelchair Bed Transfers  Wheelchair/Bed - Technique: Ambulating  Equipment Used: Bed  Level of Asssistance: Modified independent   Wheelchair Transfers Comments: no AD                                                                    PM Session:  S: Pt met in bed in room upon OT arrival. Pt agreeable to OT tx, denies pain, states she is feeling okay. O:    IADL: Pt ambulated around dept kitchen w/ mod I for 4 minutes using no device to make soup in the microwave. Pt did not require additional cues for kitchen safety or sequencing. Coordination: Pt stood without a device w/ supervision to pass two different balls of different weights and sizes back and forth w/ OT for ~5 minutes to work on reaching outside base of support and increase hand/eye coordination. Pt required a seated rest break after this activity, stated that she felt slightly short of breath. SpO2 measured at 92%    Transfers:  Pt completed supine > sit bed mobility independently, without the use of grab bars. Pt completed multiple sit >< stand transfers from w/c with mod I and no support. TherEx: Pt completed 2# dumbbell exercise to increase strength and ROM in BUEs to increase independence in ADL/IADL tasks. Pt completed shoulder flex/ext for 10 reps.  Nurse Kecia Starr into dept to  pt for ultrasound. Assessment: Pt completed all functional mob/transfers w/ mod I without a device. Pt completed simple IADL task in the kitchen with mod I and no additional cues for accuracy or sequencing. Pt completed a high level coordination task by tossing two balls of different sizes and weights back and forth w/ OT w/ supervision. Pt completed 10 reps of shoulder flex/ext w/ 2# dumbbells to increase strength and ROM in BUEs. Pt may be discharging tomorrow, 6/25/21, depending on medical status, plan to cont per POC. Pt left dept in w/c w/ nurse Myesha Khan for ultrasound.        Safety Device - Type of devices:  []  All fall risk precautions in place [] Bed alarm in place  [] Call light within reach [] Chair alarm in place [] Positioning belt [] Gait belt [x] Patient at risk for falls [] Left in bed [x] Left in chair [] Telesitter in use [] Sitter present [] Nurse notified []  None    Plan   Plan  Times per week: 5x  Times per day: Twice a day  Specific instructions for Next Treatment: kitchen tasks, car t/f  Current Treatment Recommendations: Strengthening, Balance Training, Safety Education & Training, Self-Care / ADL, Equipment Evaluation, Education, & procurement, Patient/Caregiver Education & Training, Gait Training, Functional Mobility Training, Endurance Training        Goals  Short term goals  Time Frame for Short term goals: 5 days  Short term goal 1: complete functional mobility and transfers Ind -goal met  Short term goal 2: complete bathing and dressing Ind -goal met  Short term goal 3: complete toileting Ind -goal met  Short term goal 4: complete grooming in stance at sink Ind -goal met  Short term goal 5: Pt will complete simple meal prep Ind -goal met  Patient Goals   Patient goals : return home       Therapy Time   Individual Concurrent Group Co-treatment   Time In 0715         Time Out 0800         Minutes 45           Therapy Time     Individual Co-treatment   Time In [address] [place] [address] Time Out 1545     Minutes 30     Timed Code Treatment Minutes: 30 Minutes  Variance: 15  Reason: Procedure    Nevaeh Talley S/OT    Therapist was present, directed patients care, made skilled judgement, and was responsible for assessment and treatment of the patient.     Olvin Newman OTR/L #[id number]

## 2021-06-24 NOTE — PROGRESS NOTES
Patient admitted to rehab with debility/UTI. A/A/O x 4. Transfers with contact guard assist. On carb control  diet, tolerating well. Medications taken whole in thins. On Lovenox for DVT prophylaxis. Skin scattered bruising. On room air. Has been continent  of bowel and bladder. LBM 6/24. Chair/bed alarms in use and call light in reach. L/D/A. Tele. Will monitor for safety.

## 2021-06-24 NOTE — PROGRESS NOTES
Physical Therapy  Facility/Department: [address] REHAB  Daily Treatment Note  NAME: Hiram Buerger  : 1955  MRN: [id number]    Date of Service: 2021    Discharge Recommendations:  Home with assist PRN, Outpatient PT, Patient would benefit from continued therapy after discharge   PT Equipment Recommendations  Equipment Needed: No    Assessment   Body structures, Functions, Activity limitations: Decreased functional mobility   Assessment: Pt is a 74 y.o. F. admitted  for SILVESTRE, Anemia; UGI bleed. Today , pt with increased SOB following activities. Pt able to complete transfers, bed mobility, stairs, curb, and household ambulation distances Michelle-I. community distances with more caution today being SBA secondary to being medically unstable yesterday and more SOB today. SpO2 dropped to 89% with stairs but returned to 97% with ~1 min PLB. Has met 4/5 goals while in ARU. Anticipate return home with  tomorrow  if medically cleared, prn assist, no home PT needed. Will have therapy session tomorrow to clear for dc. May benefit from OP PT for balance impairments, f/u pending progress. PT Education: Goals; General Safety;PT Role;Orientation; Functional Mobility Training;Plan of Care;Home Exercise Program;Precautions  Patient Education: Pursed lip breathing; getting pulse ox if dc tomorrow  REQUIRES PT FOLLOW UP: Yes  Activity Tolerance  Activity Tolerance: Patient Tolerated treatment well;Patient limited by endurance  Activity Tolerance: SOB throughout session     Patient Diagnosis(es): There were no encounter diagnoses. has a past medical history of CAD (coronary artery disease), Diabetes mellitus ([facility] [address] Hyperlipidemia, Shingles, and Thyroid disease. has a past surgical history that includes eye surgery; sinus surgery; and Cholecystectomy.     Restrictions  Restrictions/Precautions  Restrictions/Precautions: Fall Risk     Social/Functional History  Lives With: Spouse  Type of Home:  something to take position transitions very slowly. Pt educated on if she takes walks around her neighborhood to look for benches and safe rest places before she tries it and to bring someone with her. x4 laps 10' tandem walking CGA for safety. Pt shows improvement with heel to toe placement with line as visual. SpO2 90% upon completion ~2 min to bring back to 96%. 5 golf swing stimulations with SBA. Pt denies any pain or discomfort during motion. No LOB or unsteadiness. Amb 100'  to and around therapy terrace with varied texture surfaces and ramp without HR use mod I.  Sit<>stand to bench mod I  Amb 30' from therapy terrace without AD mod I. Mod I opening therapy gym door and going through  Standing without AD 4 min 3 X taps on 6\" step with SBA-CGA for safety    A: Patient consistently completing transfers mod I. Ambulated community distances without AD mod I. Continues to show increased difficulty with dynamic balance activities needing SBA-CGA. Plan was for pt to DC home tomorrow, but medical course has been complicated with increased SOB in last two days. Will see pt  AM session for discharge summary and clearance to dc home with prn assist if medically cleared. Discussed home safety with pt, emphasizing importance of taking her time with activities and remembering to breathe during activities. Pt voiced understanding and denied any further questions or concerns. Safety Device - Type of devices:  [x]  All fall risk precautions in place [] Bed alarm in place  [] Call light within reach [] Chair alarm in place [] Positioning belt [x] Gait belt [] Patient at risk for falls [] Left in bed [x] Left in chair [] Telesitter in use [] Sitter present [] Nurse notified [x]  Left in transport line      Goals  Short term goals  Time Frame for Short term goals:  In 5 days pt will perform  Short term goal 1: Bed mobility (I) met 6/24  Short term goal 2: Transfers (I)- met 6/24  Short term goal 3: Ambulation 500' (I) without device  Short term goal 4: Ascend/Descend 12 steps with single HR support, mod I, reciprocal pattern -met 6/24  Short term goal 5: Ascend/Descend curb step independently without device - met 6/24  Long term goals  Time Frame for Long term goals : LTG = STG  Patient Goals   Patient goals : To improve her balance and core strength    Plan    Plan  Times per week: 5-6x  Times per day: Twice a day  Plan weeks: 5 days  Specific instructions for Next Treatment: Tandem stance; single-leg stand; sit < > stand without UE support  Current Treatment Recommendations: Strengthening, Balance Training, Endurance Training, Functional Mobility Training, Transfer Training, Gait Training, Stair training, Positioning, Equipment Evaluation, Education, & procurement, Patient/Caregiver Education & Training, Safety Education & Training, Home Exercise Program, Neuromuscular Re-education  Safety Devices  Type of devices:  All fall risk precautions in place, Gait belt, Patient at risk for falls, Left in chair  Restraints  Initially in place: No     Therapy Time   Individual Concurrent Group Co-treatment   Time In 1030         Time Out 1115         Minutes 45                Second Session Therapy Time     Individual Co-treatment   Time In 1430     Time Out 1515     Minutes 45          Electronically signed by Dwayne Jackson, PT [id number] on 6/24/2021 at 12:13 PM

## 2021-06-24 NOTE — CONSULTS
Consult received. Patient seen and examined. Full note to follow. Orders placed. Thank you for consulting us.

## 2021-06-25 PROBLEM — D72.829 LEUKOCYTOSIS: Status: ACTIVE | Noted: 2021-06-25
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
## 2021-06-25 PROBLEM — N17.9 AKI (ACUTE KIDNEY INJURY) (HCC): Status: ACTIVE | Noted: 2021-06-25

## 2021-06-25 PROBLEM — J18.9 HAP (HOSPITAL-ACQUIRED PNEUMONIA): Status: ACTIVE | Noted: 2021-06-25

## 2021-06-25 LAB
ANION GAP SERPL CALCULATED.3IONS-SCNC: 14 MMOL/L (ref 3–16)
BASOPHILS ABSOLUTE: 0.1 K/UL (ref 0–0.2)
BASOPHILS RELATIVE PERCENT: 0.6 %
BLOOD BANK DISPENSE STATUS: NORMAL
BLOOD BANK DISPENSE STATUS: NORMAL
BLOOD BANK PRODUCT CODE: NORMAL
BLOOD BANK PRODUCT CODE: NORMAL
BPU ID: NORMAL
BPU ID: NORMAL
BUN BLDV-MCNC: 53 MG/DL (ref 7–20)
CALCIUM SERPL-MCNC: 8.3 MG/DL (ref 8.3–10.6)
CHLORIDE BLD-SCNC: 99 MMOL/L (ref 99–110)
CO2: 19 MMOL/L (ref 21–32)
CREAT SERPL-MCNC: 2.3 MG/DL (ref 0.6–1.2)
DESCRIPTION BLOOD BANK: NORMAL
DESCRIPTION BLOOD BANK: NORMAL
EOSINOPHILS ABSOLUTE: 0.1 K/UL (ref 0–0.6)
EOSINOPHILS RELATIVE PERCENT: 0.6 %
GFR AFRICAN AMERICAN: 25
GFR NON-AFRICAN AMERICAN: 21
GLUCOSE BLD-MCNC: 175 MG/DL (ref 70–99)
GLUCOSE BLD-MCNC: 340 MG/DL (ref 70–99)
GLUCOSE BLD-MCNC: 343 MG/DL (ref 70–99)
GLUCOSE BLD-MCNC: 356 MG/DL (ref 70–99)
GLUCOSE BLD-MCNC: 381 MG/DL (ref 70–99)
HCT VFR BLD CALC: 24.4 % (ref 36–48)
HEMOGLOBIN: 8.1 G/DL (ref 12–16)
LYMPHOCYTES ABSOLUTE: 1 K/UL (ref 1–5.1)
LYMPHOCYTES RELATIVE PERCENT: 5.5 %
MAGNESIUM: 2 MG/DL (ref 1.8–2.4)
MCH RBC QN AUTO: 29.4 PG (ref 26–34)
MCHC RBC AUTO-ENTMCNC: 33.4 G/DL (ref 31–36)
MCV RBC AUTO: 87.9 FL (ref 80–100)
MONOCYTES ABSOLUTE: 0.6 K/UL (ref 0–1.3)
MONOCYTES RELATIVE PERCENT: 3.1 %
MRSA SCREEN RT-PCR: NORMAL
NEUTROPHILS ABSOLUTE: 16.6 K/UL (ref 1.7–7.7)
NEUTROPHILS RELATIVE PERCENT: 90.2 %
PDW BLD-RTO: 14.8 % (ref 12.4–15.4)
PERFORMED ON: ABNORMAL
PLATELET # BLD: 348 K/UL (ref 135–450)
PMV BLD AUTO: 6.5 FL (ref 5–10.5)
POTASSIUM SERPL-SCNC: 4.4 MMOL/L (ref 3.5–5.1)
RBC # BLD: 2.77 M/UL (ref 4–5.2)
SODIUM BLD-SCNC: 132 MMOL/L (ref 136–145)
WBC # BLD: 18.4 K/UL (ref 4–11)

## 2021-06-25 PROCEDURE — 36415 COLL VENOUS BLD VENIPUNCTURE: CPT

## 2021-06-25 PROCEDURE — 97530 THERAPEUTIC ACTIVITIES: CPT

## 2021-06-25 PROCEDURE — 85025 COMPLETE CBC W/AUTO DIFF WBC: CPT

## 2021-06-25 PROCEDURE — 6360000002 HC RX W HCPCS: Performed by: PHYSICAL MEDICINE & REHABILITATION

## 2021-06-25 PROCEDURE — 2580000003 HC RX 258: Performed by: PHYSICAL MEDICINE & REHABILITATION

## 2021-06-25 PROCEDURE — 97110 THERAPEUTIC EXERCISES: CPT

## 2021-06-25 PROCEDURE — 1280000000 HC REHAB R&B

## 2021-06-25 PROCEDURE — 83735 ASSAY OF MAGNESIUM: CPT

## 2021-06-25 PROCEDURE — 80048 BASIC METABOLIC PNL TOTAL CA: CPT

## 2021-06-25 PROCEDURE — 99222 1ST HOSP IP/OBS MODERATE 55: CPT | Performed by: OTOLARYNGOLOGY

## 2021-06-25 PROCEDURE — 6370000000 HC RX 637 (ALT 250 FOR IP): Performed by: PHYSICAL MEDICINE & REHABILITATION

## 2021-06-25 RX ORDER — LEVOFLOXACIN 250 MG/1
250 TABLET ORAL
Qty: 4 TABLET | Refills: 0 | OUTPATIENT
Start: 2021-06-28 | End: 2021-07-02

## 2021-06-25 RX ORDER — LACTOBACILLUS RHAMNOSUS GG 10B CELL
1 CAPSULE ORAL
Status: DISCONTINUED | OUTPATIENT
Start: 2021-06-26 | End: 2021-06-25

## 2021-06-25 RX ORDER — NIFEDIPINE 90 MG/1
90 TABLET, FILM COATED, EXTENDED RELEASE ORAL DAILY
Qty: 30 TABLET | Refills: 0 | OUTPATIENT
Start: 2021-06-25

## 2021-06-25 RX ORDER — LACTOBACILLUS RHAMNOSUS GG 10B CELL
1 CAPSULE ORAL 2 TIMES DAILY WITH MEALS
Status: DISCONTINUED | OUTPATIENT
Start: 2021-06-25 | End: 2021-06-28 | Stop reason: HOSPADM

## 2021-06-25 RX ORDER — LACTOBACILLUS RHAMNOSUS GG 10B CELL
1 CAPSULE ORAL 2 TIMES DAILY WITH MEALS
Qty: 8 CAPSULE | Refills: 0 | OUTPATIENT
Start: 2021-06-28 | End: 2021-07-02

## 2021-06-25 RX ORDER — INSULIN GLARGINE 100 [IU]/ML
15 INJECTION, SOLUTION SUBCUTANEOUS NIGHTLY
Status: DISCONTINUED | OUTPATIENT
Start: 2021-06-25 | End: 2021-06-28 | Stop reason: HOSPADM

## 2021-06-25 RX ORDER — LEVOFLOXACIN 250 MG/1
250 TABLET ORAL
Status: DISCONTINUED | OUTPATIENT
Start: 2021-06-25 | End: 2021-06-28 | Stop reason: HOSPADM

## 2021-06-25 RX ADMIN — PANTOPRAZOLE SODIUM 40 MG: 40 TABLET, DELAYED RELEASE ORAL at 05:34

## 2021-06-25 RX ADMIN — INSULIN LISPRO 5 UNITS: 100 INJECTION, SOLUTION INTRAVENOUS; SUBCUTANEOUS at 09:14

## 2021-06-25 RX ADMIN — INSULIN LISPRO 1 UNITS: 100 INJECTION, SOLUTION INTRAVENOUS; SUBCUTANEOUS at 16:54

## 2021-06-25 RX ADMIN — CEFEPIME HYDROCHLORIDE 1000 MG: 1 INJECTION, POWDER, FOR SOLUTION INTRAMUSCULAR; INTRAVENOUS at 10:50

## 2021-06-25 RX ADMIN — INSULIN LISPRO 5 UNITS: 100 INJECTION, SOLUTION INTRAVENOUS; SUBCUTANEOUS at 12:47

## 2021-06-25 RX ADMIN — LEVOFLOXACIN 250 MG: 250 TABLET, FILM COATED ORAL at 16:53

## 2021-06-25 RX ADMIN — Medication 1 CAPSULE: at 16:54

## 2021-06-25 RX ADMIN — LEVOTHYROXINE SODIUM 125 MCG: 0.12 TABLET ORAL at 05:34

## 2021-06-25 RX ADMIN — INSULIN LISPRO 5 UNITS: 100 INJECTION, SOLUTION INTRAVENOUS; SUBCUTANEOUS at 16:55

## 2021-06-25 RX ADMIN — ATORVASTATIN CALCIUM 40 MG: 40 TABLET, FILM COATED ORAL at 20:16

## 2021-06-25 RX ADMIN — METOPROLOL TARTRATE 50 MG: 50 TABLET, FILM COATED ORAL at 09:11

## 2021-06-25 RX ADMIN — SODIUM CHLORIDE, PRESERVATIVE FREE 10 ML: 5 INJECTION INTRAVENOUS at 21:43

## 2021-06-25 RX ADMIN — SODIUM CHLORIDE, PRESERVATIVE FREE 10 ML: 5 INJECTION INTRAVENOUS at 09:12

## 2021-06-25 RX ADMIN — INSULIN LISPRO 2 UNITS: 100 INJECTION, SOLUTION INTRAVENOUS; SUBCUTANEOUS at 21:42

## 2021-06-25 RX ADMIN — NIFEDIPINE 90 MG: 90 TABLET, FILM COATED, EXTENDED RELEASE ORAL at 09:11

## 2021-06-25 RX ADMIN — Medication 1 CAPSULE: at 10:49

## 2021-06-25 RX ADMIN — INSULIN GLARGINE 15 UNITS: 100 INJECTION, SOLUTION SUBCUTANEOUS at 21:42

## 2021-06-25 RX ADMIN — METOPROLOL TARTRATE 50 MG: 50 TABLET, FILM COATED ORAL at 20:16

## 2021-06-25 ASSESSMENT — PAIN SCALES - GENERAL
PAINLEVEL_OUTOF10: 0

## 2021-06-25 NOTE — PROGRESS NOTES
Urine, Random    Collection Time: 06/24/21  2:40 PM   Result Value Ref Range    Protein, Ur 296.00 (H) <12 mg/dL   POCT Glucose    Collection Time: 06/24/21  4:40 PM   Result Value Ref Range    POC Glucose 155 (H) 70 - 99 mg/dl    Performed on ACCU-CHEK    POCT Glucose    Collection Time: 06/24/21  8:31 PM   Result Value Ref Range    POC Glucose 263 (H) 70 - 99 mg/dl    Performed on ACCU-CHEK    POCT Glucose    Collection Time: 06/25/21  7:06 AM   Result Value Ref Range    POC Glucose 340 (H) 70 - 99 mg/dl    Performed on ACCU-CHEK    CBC Auto Differential    Collection Time: 06/25/21  7:36 AM   Result Value Ref Range    WBC 18.4 (H) 4.0 - 11.0 K/uL    RBC 2.77 (L) 4.00 - 5.20 M/uL    Hemoglobin 8.1 (L) 12.0 - 16.0 g/dL    Hematocrit 24.4 (L) 36.0 - 48.0 %    MCV 87.9 80.0 - 100.0 fL    MCH 29.4 26.0 - 34.0 pg    MCHC 33.4 31.0 - 36.0 g/dL    RDW 14.8 12.4 - 15.4 %    Platelets 381 877 - 811 K/uL    MPV 6.5 5.0 - 10.5 fL    Neutrophils % 90.2 %    Lymphocytes % 5.5 %    Monocytes % 3.1 %    Eosinophils % 0.6 %    Basophils % 0.6 %    Neutrophils Absolute 16.6 (H) 1.7 - 7.7 K/uL    Lymphocytes Absolute 1.0 1.0 - 5.1 K/uL    Monocytes Absolute 0.6 0.0 - 1.3 K/uL    Eosinophils Absolute 0.1 0.0 - 0.6 K/uL    Basophils Absolute 0.1 0.0 - 0.2 K/uL   Basic Metabolic Panel    Collection Time: 06/25/21  7:36 AM   Result Value Ref Range    Sodium 132 (L) 136 - 145 mmol/L    Potassium 4.4 3.5 - 5.1 mmol/L    Chloride 99 99 - 110 mmol/L    CO2 19 (L) 21 - 32 mmol/L    Anion Gap 14 3 - 16    Glucose 381 (H) 70 - 99 mg/dL    BUN 53 (H) 7 - 20 mg/dL    CREATININE 2.3 (H) 0.6 - 1.2 mg/dL    GFR Non-African American 21 (A) >60    GFR  25 (A) >60    Calcium 8.3 8.3 - 10.6 mg/dL   Magnesium    Collection Time: 06/25/21  7:36 AM   Result Value Ref Range    Magnesium 2.00 1.80 - 2.40 mg/dL   POCT Glucose    Collection Time: 06/25/21 11:40 AM   Result Value Ref Range    POC Glucose 356 (H) 70 - 99 mg/dl    Performed on ACCU-CHEK        Therapy progress:  PT     Objective     Sit to Stand: Independent  Stand to sit: Independent  Bed to Chair: Supervision  Device: No Device  Assistance: Stand by assistance (for safety)  Distance: 250';  OT  PT Equipment Recommendations  Equipment Needed: No  Toilet - Technique: Ambulating  Equipment Used: Grab bars  Toilet Transfers Comments: no device  Assessment        SLP          Body mass index is 23.94 kg/m². Rehabilitation Diagnosis:   16.0, Debility (non-cardiac, non-pulmonary        Assessment and Plan:     Impairments:  generalized weakness, decreased balance, endurance, spine ROM     Debility  -PT/OT     Upper GI bleed  -EGD with gastric ulcer, antral erosions  -NSAIDs dc'ed  -ppi     Acute blood loss anemia   -Due to GIB. -Monitor Hgb, transfuse prn <7. Remains stable s/p 1 unit pRBCs on 6/23.      SILVESTRE  -Possibly AIN per Good Michael  -Avoid nephrotoxins, renally dose meds  -Monitor renal function.   -Nephrology consulted.      Leukocytosis with eosinophilia  -Per Good Michael, possibly due to AIN  -WBC remained increased on admission to ARU, no longer with eosinophilia. Work-up initiated:  ---CXR negative, procalcitonin elevated  ---Blood cultures negative  ---Lactate wnl  ---UA/culture positive, treating UTI as below  ---Repeat work-up ordered 6/23 due to new onset hypoxia and shortness of breath. CT chest concerning for pneumonia. HAP  -Started cefepime + vanc (6/23) --> Will dc vanc as MRSA swab negative.  As VS are stable, will transition to po levofloxacin and observe over the weekend to ensure ongoing improvement.  -Covid negative    Globus sensation, dysphonia  -ENT consult appreciated  -No abnormality on laryngoscopy  -Possible laryngospasm related to pneumonia, no intervention recommended    Pseudomonas UTI  -Abx for pneumonia as above will cover for this as well     Subacute T12 compression fracture  -Pain control with acetaminophen and lidoderm prn  -PT/OT    NSTEMI type II  -statin, bb     Diastolic dysfunction  -Daily wt     HTN  -metoprolol (increased dose), nifedipine (increased dose) -- trend improved     DM2  -Lantus 15 qhs, prandial 5 TID, ISS     Hypothyroidism  -levothyroxine     Bladder   -High risk retention   -Monitor PVRs, SC prn >300cc     Bowel   -High risk constipation   -senna+colace BID, PRN miralax, MoM, and bisacodyl supp.     Safety   -fall precautions     PPx  -DVT: Hold due to bleed risk  -GI: pantoprazole       Rehab Progress: Making progress. Working on strength, endurance, balance. Anticipated Dispo: home with   Services: outpatient PT  DME: shower chair  ELOS: 6/28 if continues to improve over weekend      [address]  Kirsten Salinas MD 6/25/2021, 2:10 PM

## 2021-06-25 NOTE — PROGRESS NOTES
Physical Therapy  Facility/Department: [address] [facility]  Daily Treatment Note  NAME: Brittany Aranda  : 1955  MRN: [id number]    Date of Service: 2021    Discharge Recommendations:  Home with assist PRN, Outpatient PT, Patient would benefit from continued therapy after discharge        Assessment   Assessment: Pt is a 74 y.o. F. admitted  for SILVESTRE, Anemia; UGI bleed. Today , pt with increased stomach upset and overall feeling poorly. reports diarrhea since last night. Session completed at bedside. Pt presenting with visually less SOB with exertion and reports her breathing is feeling much better. Anticipate return home with  when medically cleared, prn assist, no home PT needed. May benefit from OP PT for balance impairments, f/u pending progress. PT Education: Goals; General Safety;PT Role;Orientation; Functional Mobility Training;Plan of Care;Home Exercise Program;Precautions  Activity Tolerance  Activity Tolerance: Patient Tolerated treatment well;Patient limited by endurance  Activity Tolerance: medical status requiring being seen bedside     Patient Diagnosis(es): There were no encounter diagnoses. has a past medical history of CAD (coronary artery disease), Diabetes mellitus ([facility] [address] Hyperlipidemia, Shingles, and Thyroid disease. has a past surgical history that includes eye surgery; sinus surgery; and Cholecystectomy.     Restrictions  Restrictions/Precautions  Restrictions/Precautions: Fall Risk   Social/Functional History  Lives With: Spouse  Type of Home: House  Home Layout: One level  Home Access: Stairs to enter without rails  Entrance Stairs - Number of Steps: 2-3 through garage; 5 steps through front/back  Bathroom Shower/Tub: Walk-in shower  Bathroom Toilet: Handicap height  ADL Assistance: [facility] [facility] [address]: Independent  Homemaking Responsibilities: Yes  Ambulation Assistance: Independent  Transfer Assistance: Independent  Active : Yes  Occupation: Retired  Type of occupation: small drywall buisness  Leisure & Hobbies: golf  Additional Comments: Pt reports 2 falls in past 6 months    Subjective   General  Chart Reviewed: Yes  Additional Pertinent Hx: Per Dr. Jamir Pearl, \"Patient is a 73 yo F with pmh DM2 and hypothyroidism who initially presented to [facility] on 6/12/2021 with dark diarrhea, n/v, and progressive weakness. She did have a fall prior to presenting to the ED, fell backwards and did hit the back of her head. Found to have upper GI bleed. EGD revealed ulcer at GE junction, minimal gastric erosion in the antrum. She was started on ppi and Venofer. Of note, patient has been taking NSAIDs daily for 3 months due to back pain related to T12 compression fracture. Course was complicated by SILVESTRE, NSTEMI, DD2 on TTE. 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 Now presents to ARU with impaired mobility and self-care below her baseline. Currently, patient reports generalized weakness and fatigue. She has mild back pain. She denies abdominal pain, n/v, blood in stool. She is motivated to start inpatient rehab program.\"  Response To Previous Treatment: Not applicable  Referring Practitioner: Dr. Jamir Pearl  Subjective  Subjective: Pt needed to be seen bedside secondary to pt feeling unwell. She reports stomach feels \"mehhhhh\" and that she keeps having diarrhea. Denies pains at this time. General Comment  Comments: on IV antibiotics      Objective   Bed mobility  Supine to Sit: Independent  Sit to Supine: Independent  Scooting: Independent  Transfers  Sit to Stand: Independent  Stand to sit:  Independent  Car Transfer: Modified independent (walk up to car, sit first, swing legs in/out, stand without need for UE assist)  Ambulation  Ambulation?: No (Per transportation RN requested bedside session)        Exercises  Straight Leg Raise: supine with HOB elevated x 20 BL  Hamstring Sets: HS curls sitting at EOB x 35 with Red TB  Heelslides: x20 BL supine with HOB elevated  Gluteal Sets: hold 5 sec x20  Hip Flexion: seated marches at EOB  x35  Hip Abduction: supine with HOB elevated x25 BL  Knee Long Arc Quad: x25 BL sitting EOB  Ankle Pumps: x25 BL heel/toe raises supine with HOB elevated  Core Strengthening: Transverse abdom bracing sitting EOB  5 sec hold x15  Comments: mini squats x10  Other exercises  Other exercises?: No         PM session:   S: Pt denies any pain at this time. Seen at bedside secondary to stomach discomfort and freq diarrhea. O:  Sitting at EOB x30 marches, LAQ x30, heel/toe raises   Amb in around room ~ 100' without AD Supervision for safety. Pt with slight SOB following. Sit <>stand  Independent (x6)  Supine to sit independent  Standing marching without AD CGA for safety x10 BL   Standing tandem walks 12' x 4 with CGA. Small lateral LOBs throughout that pt able to self correct. Slight SOB following that normalizes with short sit down rest break. Lateral walks with slight knee bend 6' R and L x5  10 mini squats with postural training   Dr. Felice Alba in during the session to discuss dc and it was decided among Dr.,  and pt that she will stay until Monday as long as she continues to feel better. A: Pt continued to have an upset stomach throughout the session and was seen in only room secondary to medical status. She tolerated supine and standing exercises without any complaints of discomfort or SOB. Continue to be independent in ambulation and transfers. Safety Device - Type of devices:  [x]  All fall risk precautions in place [] Bed alarm in place  [x] Call light within reach [] Chair alarm in place [] Positioning belt [x] Gait belt [] Patient at risk for falls [x] Left in bed [] Left in chair [] Telesitter in use [] Sitter present [] Nurse notified []  None        Goals  Short term goals  Time Frame for Short term goals:  In 5 days pt will perform  Short term goal 1: Bed mobility (I) met 6/24  Short term goal 2: Transfers (I)- met 6/24  Short term goal 3: Ambulation 500' (I) without device  Short term goal 4: Ascend/Descend 12 steps with single HR support, mod I, reciprocal pattern -met 6/24  Short term goal 5: Ascend/Descend curb step independently without device - met 6/24  Long term goals  Time Frame for Long term goals : LTG = STG  Patient Goals   Patient goals : To improve her balance and core strength    Plan    Plan  Times per week: 5-6x  Times per day: Twice a day  Plan weeks: 5 days  Specific instructions for Next Treatment: Tandem stance; single-leg stand; sit < > stand without UE support  Current Treatment Recommendations: Strengthening, Balance Training, Endurance Training, Functional Mobility Training, Transfer Training, Gait Training, Stair training, Positioning, Equipment Evaluation, Education, & procurement, Patient/Caregiver Education & Training, Safety Education & Training, Home Exercise Program, Neuromuscular Re-education  Safety Devices  Type of devices: All fall risk precautions in place, Gait belt, Patient at risk for falls, Call light within reach, Left in bed (left in room with her  and daughter)  Restraints  Initially in place: No     Therapy Time   Individual Concurrent Group Co-treatment   Time In 1030         Time Out 1115         Minutes 39           Second Session Therapy Time     Individual Co-treatment   Time In 1440     Time Out 1520     Minutes 40     Variance: 5 (late from pt before.  (OT 50 min in morning so minutes met))  Electronically signed by Ambreen Parisi, PT [id number] on 6/25/2021 at 4:21 PM

## 2021-06-25 NOTE — PLAN OF CARE
Problem: Falls - Risk of:  Goal: Will remain free from falls  Description: Will remain free from falls  Outcome: Ongoing  Note: Assessment completed. Fall precautions in place. Uses walker x 1 for transfers. Fall precautions in place. Non skid footwear and armband on. Bed/chair alarms in place and functioning. Personal items and call light within reach. Monitored frequently. Goal: Absence of physical injury  Description: Absence of physical injury  Outcome: Ongoing     Problem: Safety:  Goal: Free from accidental physical injury  Description: Free from accidental physical injury  Outcome: Ongoing  Goal: Free from intentional harm  Description: Free from intentional harm  Outcome: Ongoing     Problem: Skin Integrity:  Goal: Skin integrity will stabilize  Description: Skin integrity will stabilize  Outcome: Ongoing  Note: Assessment completed. No sign or symptoms of infection noted. Will continue to monitor.

## 2021-06-25 NOTE — PLAN OF CARE
Problem: Falls - Risk of:  Goal: Will remain free from falls  Description: Will remain free from falls  6/24/2021 2236 by Claudette Robin RN  Outcome: Ongoing  Note: Assessment completed. Fall precautions in place. Uses CGA for transfers. Fall precautions in place. Non skid footwear and armband on. Bed/chair alarms in place and functioning. Personal items and call light within reach. Monitored frequently. Problem: Pain:  Goal: Patient's pain/discomfort is manageable  Description: Patient's pain/discomfort is manageable  Outcome: Ongoing     Problem: Skin Integrity:  Goal: Skin integrity will stabilize  Description: Skin integrity will stabilize  6/24/2021 2236 by Claudette Robin RN  Outcome: Ongoing  Note: Assessment completed. Skin integrity being maintained at baseline at this time.       Problem: Discharge Planning:  Goal: Patients continuum of care needs are met  Description: Patients continuum of care needs are met  Outcome: Ongoing

## 2021-06-25 NOTE — PROGRESS NOTES
Patient pleasant and cooperative with all care. Reported by patient she had numerous bouts of loose stools earlier in the AM.  Writer cannot verify because family took patient to bathroom and toilet was flushed by patient. Denies pain or discomfort. Personal items and call light within reach. Able to voice all needs. Continent of bowel and bladder. Will continue to monitor.

## 2021-06-25 NOTE — CONSULTS
[facility]      Patient Name: [address] [place] [address] Record Number:  [id number]  Primary Care Physician:  Kylie Rae  Date of Consultation: 6/25/2021    Chief Complaint: Respiratory issues        HISTORY OF PRESENT ILLNESS  Claude Cage is a(n) 74 y.o. female who was admitted after a GI bleed. Recently diagnosed with pneumonia. ENT consulted because she was having some upper airway distress. The patient said that about 3 nights ago she started having brief episode where she did not feel like she can catch her breath. She described it as similar to when her kids had croup. She is never had this happen before. Currently she does not have trouble breathing. She is not a smoker. No coughing up blood or other concerning symptoms. REVIEW OF SYSTEMS  As above    PHYSICAL EXAM  GENERAL: No Acute Distress, Alert and Oriented, no Hoarseness, strong voice  EYES: EOMI, Anti-icteric  HENT:   Head: Normocephalic and atraumatic. Face:  Symmetric, facial nerve intact, no sinus tenderness  Right Ear: Normal external ear, normal external auditory canal, intact tympanic membrane with normal mobility and aerated middle ear  Left Ear: Normal external ear, normal external auditory canal, intact tympanic membrane with normal mobility and aerated middle ear  Mouth/Oral Cavity:  normal lips, Uvula is midline  Oropharynx/Larynx:  normal oropharynx  Nose:Normal external nasal appearance. NECK: Normal range of motion, no thyromegaly, trachea is midline, no lymphadenopathy, no neck masses, no crepitus  CHEST: Normal respiratory effort, no retractions, breathing comfortably  SKIN: No rashes, normal appearing skin, no evidence of skin lesions/tumors  Neuro:  cranial nerve II-XII intact; normal gait  Cardio:  no edema        RADIOLOGY  Summary of findings:  I reviewed the CT scan of the chest from yesterday. It starts about the level of the cricoid.   Inferior to this the trachea appears to be of normal patency        PROCEDURE  Laryngoscopy  Afrin and lidocaine were applied the bilateral nasal cavity. A flexible scope was passed to the right nasal cavity. Nasopharynx was normal.  Base of tongue and vallecula are normal.  The bilateral vocal cords appear to be normal with normal mobility. No evidence of glottic or supraglottic stenosis. The visualized subglottis appeared to be normal as well. ASSESSMENT/PLAN  Respiratory distress-the patient does not really have any evidence of an upper airway obstruction on my flexible laryngoscopy or the CT scan that I reviewed from yesterday. I think this is all related to her pneumonia. She could be having some brief episodes of vocal cord dysfunction related pneumonia as well. This would look like a laryngospasm. I would recommend any intervention at this time. I think that this will resolve when the pneumonia has resolved. I have performed a head and neck physical exam personally or was physically present during the key or critical portions of the service. This note was generated completely or in part utilizing Dragon dictation speech recognition software. Occasionally, words are mistranscribed and despite editing, the text may contain inaccuracies due to incorrect word recognition. If further clarification is needed please contact the office at [phone].

## 2021-06-25 NOTE — PROGRESS NOTES
Nephrology ([facility]) Progress Note    CC: SILVESTRE    Subjective:    HPI:  Breathing comfortably. No CP. Urinating. Renal function unchanged. ROS:  In bed. No fever. [address]:  medications reviewed. Objective:  Blood pressure (!) 158/80, pulse 84, temperature 98 °F (36.7 °C), temperature source Oral, resp. rate 16, height 5' 3\" (1.6 m), weight 135 lb 2.3 oz (61.3 kg), SpO2 91 %. Intake/Output Summary (Last 24 hours) at 6/25/2021 1603  Last data filed at 6/25/2021 1337  Gross per 24 hour   Intake 900 ml   Output --   Net 900 ml     General:  NAD, A+Ox3  Chest:  CTAB  CVS:  RRR  Abdominal:  NTND, soft, +BS  Extremities:  no edema  Skin:  no rash    Labs:  Renal panel:  Lab Results   Component Value Date/Time     (L) 06/25/2021 07:36 AM    K 4.4 06/25/2021 07:36 AM    K 4.0 06/19/2021 05:19 AM    CO2 19 (L) 06/25/2021 07:36 AM    BUN 53 (H) 06/25/2021 07:36 AM    CREATININE 2.3 (H) 06/25/2021 07:36 AM    CALCIUM 8.3 06/25/2021 07:36 AM    MG 2.00 06/25/2021 07:36 AM     CBC:  Lab Results   Component Value Date/Time    WBC 18.4 (H) 06/25/2021 07:36 AM    HGB 8.1 (L) 06/25/2021 07:36 AM    HCT 24.4 (L) 06/25/2021 07:36 AM     06/25/2021 07:36 AM       Assessment/Plan:  Reviewed old records and labs.     1) SILVESTRE              - baseline 11/25/20 Cr 1.1              - ddx:  ATN vs. NSAIDS vs. CKD              - renal ultrasound unremarkable although increased echogenicity              - discussed NSAID avoidance   - it is concerning that her renal function has not improved further although I can take ~ 1 month for renal functio to improve     2) subacute T12 compression fracture              - per Dr. Elier Atkins     3) HTN              - controlled     4) ID              - HCAP              - on empiric vancomycin and cefepime              - blood cultures NGTD, urine had 25k psuedomonas

## 2021-06-25 NOTE — CONSULTS
Nephrology ([facility]) Consult Note    Chava Russo is a 74 y.o. female whom we were asked to see for SILVESTRE. The patient was initially admitted to [facility] with UGIB due to NSAID use. Her serum creatinine was initially 3.3, and improved to 2.04 at the time of discharge. Nephrology was not consulted at the time. She was transferred here for ARU, and her serum creatinine has not changed significantly. Past Medical History:  CAD  DM2  HLP  hypothyroidism    Review of System:  Otherwise unremarkable    Allergies:  Lisinopril and Metformin and related    Medications:  Current medications reviewed. Social History:  former tobacco  Family Medical History:  Negative for kidney disase    Physical Exam:  Vitals reviewed. General:  NAD, A+Ox3, ill-appearing, normal body habitus  HEENT:  PERRL, EOMI  Neck:  Supple, normal range of movement  Chest:  CTAB, good respiratory effort, good air movement  CV:  RRR, no murmurs or rubs, no carotid bruit, no abdominal bruits  Abdomen:  NTND, soft, +BS, no hepatosplenomegaly  Extremities:  No peripheral edema  Neurological:  Moving all four extremities, CN II-XII grossly intact  Lymphatics:  No palpable lymph nodes  Skin:  No rash, no jaundice  Psychiatric:  Normal insight and judgement, good recall    Labs reviewed. Assessment/Plan:  Reviewed old records and labs.     1) SILVESTRE   - baseline 11/25/20 Cr 1.1   - ddx:  ATN vs. NSAIDS vs. ?   - check urine studies   - check renal ultrasound   - discussed NSAID avoidance    2) subacute T12 compression fracture   - per Dr. Richard Ding    3) HTN   - controlled    4) ID   - HCAP   - on empiric vancomycin and cefepime   - blood cultures NGTD, urine had 25k psuedomonas

## 2021-06-25 NOTE — DISCHARGE INSTR - COC
(0-10 scale): Pain Level: 0  Last Weight:   Wt Readings from Last 1 Encounters:   06/25/21 135 lb 2.3 oz (61.3 kg)     Mental Status:  oriented and alert    IV Access:  - None    Nursing Mobility/ADLs:  Walking   Independent  Transfer  Independent  Bathing  Independent  Dressing  Independent  Toileting  Independent  Feeding  Independent  Med [address] [facility] Delivery   none    Wound Care Documentation and Therapy:        Elimination:  Continence:   · Bowel: Yes  · Bladder: Yes  Urinary Catheter: None   Colostomy/Ileostomy/Ileal Conduit: No       Date of Last BM: 06/25/21    Intake/Output Summary (Last 24 hours) at 6/25/2021 1610  Last data filed at 6/25/2021 1337  Gross per 24 hour   Intake 900 ml   Output --   Net 900 ml     I/O last 3 completed shifts: In: 900 [P.O.:600; IV Piggyback:300]  Out: -     Safety Concerns: At Risk for Falls    Impairments/Disabilities:      None    Nutrition Therapy:  Current Nutrition Therapy: Carb Control      Routes of Feeding: Oral  Liquids: Thin liquids  Daily Fluid Restriction: no  Last Modified Barium Swallow with Video (Video Swallowing Test): not done    Treatments at the Time of Hospital Discharge:   Respiratory Treatments: Duoneb  Oxygen Therapy:  is not on home oxygen therapy.   Ventilator:    - No ventilator support    Rehab Therapies: none  Weight Bearing Status/Restrictions: No weight bearing restirctions  Other Medical Equipment (for information only, NOT a DME order):  none  Other Treatments: none    Patient's personal belongings (please select all that are sent with patient):  personal items; cell phone; clothing; footwear    RN SIGNATURE:  Electronically signed by Sarahi Harris RN on 6/28/21 at 10:14 AM EDT    CASE MANAGEMENT/SOCIAL WORK SECTION    Inpatient Status Date: ***    Readmission Risk Assessment Score:  Readmission Risk              Risk of Unplanned Readmission:  15           Discharging to Facility/ Agency   · Name: · Address:  · Phone:  · Fax:    Dialysis Facility (if applicable)   · Name:  · Address:  · Dialysis Schedule:  · Phone:  · Fax:    / signature: {Esignature:[id number]:::0}    PHYSICIAN SECTION    Prognosis: Good    Condition at Discharge: Stable    Rehab Potential (if transferring to Rehab): Good    Recommended Labs or Other Treatments After Discharge:   Home care PT, OT, RN  Repeat CBC, BMP, Mag within 1 week  Follow-up with PCP, Nephrology, Heme/Onc    Physician Certification: I certify the above information and transfer of Pat Isidro  is necessary for the continuing treatment of the diagnosis listed and that she requires Home Care for less 30 days.      Update Admission H&P: No change in H&P    PHYSICIAN SIGNATURE:  Electronically signed by Jenny Cruz MD on 6/25/21 at 4:11 PM EDT

## 2021-06-25 NOTE — PROGRESS NOTES
Yes  Additional Pertinent Hx: Patient is a 75 yo F with pmh DM2 and hypothyroidism who intitially presented to [facility] on 6/12/2021 with dark diarrhea, n/v, and progressive weakness. She did have a fall prior to presenting to the ED, fell backwards and did hit the back of her head. Found to have upper GI bleed. EGD revealed ulcer at GE junction, minimal gastric erosion in the antrum. She was started on ppi and Venofer. Of note, patient has been taking NSAIDs daily for 3 months due to back pain related to T12 compression fracture. Course was complicated by SILVESTRE, NSTEMI, DD2 on TTE. 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 Now presents to ARU with impaired mobility and self-care below her baseline. Currently, patient reports generalized weakness and fatigue. She has mild back pain. She denies abdominal pain, n/v, blood in stool. She is motivated to start inpatient rehab program.  Response to previous treatment: Patient with no complaints from previous session  Family / Caregiver Present: Yes  Referring Practitioner: Bruno Carr MD  Diagnosis: Upper GI Bleed  Subjective  Subjective: Pt met sitting up in bed upon OT arrival, agreeable to bedside treatment. C/O diarrhea just prior. Pt denies pain. Spouse and dtr present.   General Comment  Comments: agreeable for bed mob, transfer, toileting & UE strengthening      Orientation  Orientation  Overall Orientation Status: Within Functional Limits  Objective    ADL  Grooming: Modified independent  (washed hands in stance)  Toileting: Modified independent  (voided/sm BM, nurse aware)        Balance  Sitting Balance: Independent  Standing Balance: Modified independent   Standing Balance  Time: 16 minutes, then 12 minutes for various tabletop activities  Activity: puzzle  Functional Mobility  Functional - Mobility Device: No device  Assist Level: Modified independent   Functional Mobility Comments: pt ambulated without device w/ mod I around her room, >< bathroom, pushed own IV pole(OT did supervise)  Toilet Transfers  Toilet - Technique: Ambulating  Equipment Used: Grab bars  Toilet Transfer: Modified independent  Toilet Transfers Comments: no device  Wheelchair Bed Transfers  Wheelchair/Bed - Technique: Ambulating  Equipment Used: Bed  Level of Asssistance: Modified independent   Wheelchair Transfers Comments: >< edge of bed 3x ind  Bed mobility  Supine to Sit: Independent  Sit to Supine: Independent  Scooting: Independent                          Cognition  Overall Cognitive Status: WNL  Cognition Comment: pt worked mod complexity puzzle (endangered species) puzzle occ. cues with increased amt of time. Type of ROM/Therapeutic Exercise  Type of ROM/Therapeutic Exercise: Cane/Wand  Comment: 3# dowel ex for 10 reps of over head press x 2 sets, chest press x 15 reps for 2 sets, shoulder abd/ad w/o wt x 15 reps with focus on opening up lungs, deep breathing & not holding breath. Ther ex to increase activity tolerance for ADL/IADL/mob tasks. PM Session:   S: Pt met in room standing at EOB w/ PT finishing session. Pt agreeable to OT session, states that back is a bit stiff, but tolerable. No IV running as was in AM session. O:   Transfers:   Pt completed multiple sit > stand transfers from the EOB to ambulate around the room w/ mod I and no device. TherEx:   Pt used a red theraband and completed 15 reps of the following exercises to increase strength and ROM in BUEs for independence in ADL/IADL tasks: shoulder flex/ext, chest press, tricep ext/flex, bicep flex/ext, horizontal IR/ER, and scapular protraction/retraction. Endurance: Pt stood at tabletop w/ mod I for 13 minutes to play Score 4 with S/OT. Pt able to engage/participate in game without requiring additional cues for cognition. Pt sat EOB to practice incentive spirometer, as she stated that she did not complete it well when practicing earlier.  OT educated pt on how to properly use device, pt practiced for ~5 minutes while seated at EOB. This activity helps to address pt's shortness of breath/strengthens lungs that has been occurring during ADL/IADL activities. Assessment: Pt completed red theraband exercises while seated EOB to increase strength and ROM in BUEs for ADL/IADL tasks. Pt stood for 13 minutes to complete board game at tabletop mod I to increase activity tolerance. Pt completed incentive spirometer practice while seated EOB. Pt continues to function below baseline, plan to cont per POC. Pt to D/C Monday now, so will put back on schedule for ADL on Monday. Pt left in room w/ all needs met w/ daughter present in room.      Safety Device - Type of devices:  []  All fall risk precautions in place [] Bed alarm in place  [x] Call light within reach [] Chair alarm in place [] Positioning belt [] Gait belt [x] Patient at risk for falls [x] Left in bed [] Left in chair [] Telesitter in use [] Sitter present [] Nurse notified []  None     Plan   Plan  Times per week: 5x  Times per day: Twice a day  Specific instructions for Next Treatment: kitchen tasks, car t/f  Current Treatment Recommendations: Strengthening, Balance Training, Safety Education & Training, Self-Care / ADL, Equipment Evaluation, Education, & procurement, Patient/Caregiver Education & Training, Gait Training, Functional Mobility Training, Endurance Training          Goals  Short term goals  Time Frame for Short term goals: 5 days  Short term goal 1: complete functional mobility and transfers Ind -goal met  Short term goal 2: complete bathing and dressing Ind -goal met  Short term goal 3: complete toileting Ind -goal met  Short term goal 4: complete grooming in stance at sink Ind -goal met  Short term goal 5: Pt will complete simple meal prep Ind -goal met  Patient Goals   Patient goals : return home       Therapy Time   Individual Concurrent Group Co-treatment   Time In 1115 Time Out 1205         Minutes 50         Timed Code Treatment Minutes: 50 Minutes        Therapy Time     Individual Co-treatment   Time In 1520     Time Out 1605     Minutes ARI Buckner/ROSITA     Therapist was present, directed patients care, made skilled judgement, and was responsible for assessment and treatment of the patient.       Lola Bahena OTR/L #[id number]

## 2021-06-26 LAB
ANION GAP SERPL CALCULATED.3IONS-SCNC: 12 MMOL/L (ref 3–16)
BASOPHILS ABSOLUTE: 0.1 K/UL (ref 0–0.2)
BASOPHILS RELATIVE PERCENT: 0.9 %
BUN BLDV-MCNC: 53 MG/DL (ref 7–20)
CALCIUM SERPL-MCNC: 8.1 MG/DL (ref 8.3–10.6)
CHLORIDE BLD-SCNC: 105 MMOL/L (ref 99–110)
CO2: 19 MMOL/L (ref 21–32)
CREAT SERPL-MCNC: 2.3 MG/DL (ref 0.6–1.2)
EOSINOPHILS ABSOLUTE: 0.2 K/UL (ref 0–0.6)
EOSINOPHILS RELATIVE PERCENT: 1 %
GFR AFRICAN AMERICAN: 25
GFR NON-AFRICAN AMERICAN: 21
GLUCOSE BLD-MCNC: 100 MG/DL (ref 70–99)
GLUCOSE BLD-MCNC: 112 MG/DL (ref 70–99)
GLUCOSE BLD-MCNC: 148 MG/DL (ref 70–99)
GLUCOSE BLD-MCNC: 260 MG/DL (ref 70–99)
GLUCOSE BLD-MCNC: 280 MG/DL (ref 70–99)
HCT VFR BLD CALC: 23.7 % (ref 36–48)
HEMOGLOBIN: 8.1 G/DL (ref 12–16)
LYMPHOCYTES ABSOLUTE: 1 K/UL (ref 1–5.1)
LYMPHOCYTES RELATIVE PERCENT: 6.8 %
MAGNESIUM: 2 MG/DL (ref 1.8–2.4)
MCH RBC QN AUTO: 30 PG (ref 26–34)
MCHC RBC AUTO-ENTMCNC: 34.4 G/DL (ref 31–36)
MCV RBC AUTO: 87.1 FL (ref 80–100)
MONOCYTES ABSOLUTE: 0.7 K/UL (ref 0–1.3)
MONOCYTES RELATIVE PERCENT: 4.7 %
NEUTROPHILS ABSOLUTE: 13.3 K/UL (ref 1.7–7.7)
NEUTROPHILS RELATIVE PERCENT: 86.6 %
PDW BLD-RTO: 14.4 % (ref 12.4–15.4)
PERFORMED ON: ABNORMAL
PLATELET # BLD: 326 K/UL (ref 135–450)
PMV BLD AUTO: 6.4 FL (ref 5–10.5)
POTASSIUM SERPL-SCNC: 4.1 MMOL/L (ref 3.5–5.1)
RBC # BLD: 2.72 M/UL (ref 4–5.2)
SODIUM BLD-SCNC: 136 MMOL/L (ref 136–145)
WBC # BLD: 15.3 K/UL (ref 4–11)

## 2021-06-26 PROCEDURE — 94760 N-INVAS EAR/PLS OXIMETRY 1: CPT

## 2021-06-26 PROCEDURE — 36415 COLL VENOUS BLD VENIPUNCTURE: CPT

## 2021-06-26 PROCEDURE — 2580000003 HC RX 258: Performed by: PHYSICAL MEDICINE & REHABILITATION

## 2021-06-26 PROCEDURE — 6370000000 HC RX 637 (ALT 250 FOR IP): Performed by: PHYSICAL MEDICINE & REHABILITATION

## 2021-06-26 PROCEDURE — 80048 BASIC METABOLIC PNL TOTAL CA: CPT

## 2021-06-26 PROCEDURE — 1280000000 HC REHAB R&B

## 2021-06-26 PROCEDURE — 83735 ASSAY OF MAGNESIUM: CPT

## 2021-06-26 PROCEDURE — 85025 COMPLETE CBC W/AUTO DIFF WBC: CPT

## 2021-06-26 RX ADMIN — PANTOPRAZOLE SODIUM 40 MG: 40 TABLET, DELAYED RELEASE ORAL at 06:14

## 2021-06-26 RX ADMIN — SODIUM CHLORIDE, PRESERVATIVE FREE 10 ML: 5 INJECTION INTRAVENOUS at 21:38

## 2021-06-26 RX ADMIN — Medication 1 CAPSULE: at 16:40

## 2021-06-26 RX ADMIN — INSULIN LISPRO 5 UNITS: 100 INJECTION, SOLUTION INTRAVENOUS; SUBCUTANEOUS at 11:31

## 2021-06-26 RX ADMIN — SODIUM CHLORIDE, PRESERVATIVE FREE 10 ML: 5 INJECTION INTRAVENOUS at 09:12

## 2021-06-26 RX ADMIN — INSULIN LISPRO 3 UNITS: 100 INJECTION, SOLUTION INTRAVENOUS; SUBCUTANEOUS at 07:33

## 2021-06-26 RX ADMIN — INSULIN LISPRO 5 UNITS: 100 INJECTION, SOLUTION INTRAVENOUS; SUBCUTANEOUS at 07:28

## 2021-06-26 RX ADMIN — METOPROLOL TARTRATE 50 MG: 50 TABLET, FILM COATED ORAL at 08:26

## 2021-06-26 RX ADMIN — INSULIN GLARGINE 15 UNITS: 100 INJECTION, SOLUTION SUBCUTANEOUS at 21:32

## 2021-06-26 RX ADMIN — ATORVASTATIN CALCIUM 40 MG: 40 TABLET, FILM COATED ORAL at 21:32

## 2021-06-26 RX ADMIN — INSULIN LISPRO 1 UNITS: 100 INJECTION, SOLUTION INTRAVENOUS; SUBCUTANEOUS at 11:30

## 2021-06-26 RX ADMIN — Medication 1 CAPSULE: at 07:28

## 2021-06-26 RX ADMIN — LEVOTHYROXINE SODIUM 125 MCG: 0.12 TABLET ORAL at 06:13

## 2021-06-26 RX ADMIN — LEVOFLOXACIN 250 MG: 250 TABLET, FILM COATED ORAL at 17:45

## 2021-06-26 RX ADMIN — METOPROLOL TARTRATE 50 MG: 50 TABLET, FILM COATED ORAL at 21:32

## 2021-06-26 RX ADMIN — INSULIN LISPRO 5 UNITS: 100 INJECTION, SOLUTION INTRAVENOUS; SUBCUTANEOUS at 16:41

## 2021-06-26 RX ADMIN — NIFEDIPINE 90 MG: 90 TABLET, FILM COATED, EXTENDED RELEASE ORAL at 08:26

## 2021-06-26 ASSESSMENT — PAIN SCALES - GENERAL
PAINLEVEL_OUTOF10: 0

## 2021-06-26 NOTE — PLAN OF CARE
Problem: Falls - Risk of:  Goal: Will remain free from falls  Description: Will remain free from falls  Outcome: Ongoing  Note: Pt AAO X4. Admitted with upper GI bleed. Free of fall. Transfers with SBA and gait belt.  at bedside, okay to transfer patient. Yellow socks on. Wheels locked. Bed lowest position. Alarm on. Call light, over the bed table, and personal belonging in reach. Hourly rounding. Patient instructed to call for needs or changes. Problem: Pain:  Goal: Patient's pain/discomfort is manageable  Description: Patient's pain/discomfort is manageable  Outcome: Ongoing  Note: Patient denies of any pain during the shift. Will continue to monitor. Problem: Skin Integrity:  Goal: Skin integrity will stabilize  Description: Skin integrity will stabilize  Outcome: Ongoing  Note: No new skin breakdown. Able to reposition self. Pillow support. Clean and dry skin. Lower extremities swollen. Heels elevated.

## 2021-06-26 NOTE — PLAN OF CARE
Problem: Falls - Risk of:  Goal: Will remain free from falls  Description: Will remain free from falls  Outcome: Ongoing  Note: No issues noted, ambulates no device,  and daughter can assist,  stays. Problem: Infection:  Goal: Will remain free from infection  Description: Will remain free from infection  Outcome: Ongoing  Note: WBCs improving, denies juan r issues on po antibiotic. Problem: Daily Care:  Goal: Daily care needs are met  Description: Daily care needs are met  Outcome: Ongoing  Note:  does assist with needs. Problem: Pain:  Goal: Patient's pain/discomfort is manageable  Description: Patient's pain/discomfort is manageable  Outcome: Ongoing  Note: Has denied pain thus far, monitor. Problem: Skin Integrity:  Goal: Skin integrity will stabilize  Description: Skin integrity will stabilize  Outcome: Ongoing  Note: No issues noted, monitor. Problem: Discharge Planning:  Goal: Patients continuum of care needs are met  Description: Patients continuum of care needs are met  Outcome: Ongoing  Note: Discharge canceled for this week, hopeful for discharge Monday. Problem: Metabolic:  Goal: Ability to maintain appropriate glucose levels will improve  Description: Ability to maintain appropriate glucose levels will improve  Outcome: Ongoing  Note: States has had issues with blood sugars at home, not on meal time insulin, has been on Lantus past 4 years.

## 2021-06-27 LAB
ANION GAP SERPL CALCULATED.3IONS-SCNC: 13 MMOL/L (ref 3–16)
BASOPHILS ABSOLUTE: 0.1 K/UL (ref 0–0.2)
BASOPHILS RELATIVE PERCENT: 0.9 %
BUN BLDV-MCNC: 57 MG/DL (ref 7–20)
CALCIUM SERPL-MCNC: 7.9 MG/DL (ref 8.3–10.6)
CHLORIDE BLD-SCNC: 105 MMOL/L (ref 99–110)
CO2: 19 MMOL/L (ref 21–32)
CREAT SERPL-MCNC: 2.4 MG/DL (ref 0.6–1.2)
EOSINOPHILS ABSOLUTE: 0.2 K/UL (ref 0–0.6)
EOSINOPHILS RELATIVE PERCENT: 1.7 %
GFR AFRICAN AMERICAN: 24
GFR NON-AFRICAN AMERICAN: 20
GLUCOSE BLD-MCNC: 111 MG/DL (ref 70–99)
GLUCOSE BLD-MCNC: 117 MG/DL (ref 70–99)
GLUCOSE BLD-MCNC: 125 MG/DL (ref 70–99)
GLUCOSE BLD-MCNC: 145 MG/DL (ref 70–99)
GLUCOSE BLD-MCNC: 91 MG/DL (ref 70–99)
GLUCOSE BLD-MCNC: 91 MG/DL (ref 70–99)
HCT VFR BLD CALC: 21.9 % (ref 36–48)
HEMOGLOBIN: 7.5 G/DL (ref 12–16)
LYMPHOCYTES ABSOLUTE: 1.1 K/UL (ref 1–5.1)
LYMPHOCYTES RELATIVE PERCENT: 9.2 %
MAGNESIUM: 2 MG/DL (ref 1.8–2.4)
MCH RBC QN AUTO: 29.4 PG (ref 26–34)
MCHC RBC AUTO-ENTMCNC: 34.1 G/DL (ref 31–36)
MCV RBC AUTO: 86.1 FL (ref 80–100)
MONOCYTES ABSOLUTE: 0.6 K/UL (ref 0–1.3)
MONOCYTES RELATIVE PERCENT: 5.3 %
NEUTROPHILS ABSOLUTE: 9.8 K/UL (ref 1.7–7.7)
NEUTROPHILS RELATIVE PERCENT: 82.9 %
PDW BLD-RTO: 14.6 % (ref 12.4–15.4)
PERFORMED ON: ABNORMAL
PERFORMED ON: NORMAL
PERFORMED ON: NORMAL
PLATELET # BLD: 320 K/UL (ref 135–450)
PMV BLD AUTO: 6.4 FL (ref 5–10.5)
POTASSIUM SERPL-SCNC: 4.1 MMOL/L (ref 3.5–5.1)
RBC # BLD: 2.55 M/UL (ref 4–5.2)
SODIUM BLD-SCNC: 137 MMOL/L (ref 136–145)
WBC # BLD: 11.8 K/UL (ref 4–11)

## 2021-06-27 PROCEDURE — 94760 N-INVAS EAR/PLS OXIMETRY 1: CPT

## 2021-06-27 PROCEDURE — 2580000003 HC RX 258: Performed by: PHYSICAL MEDICINE & REHABILITATION

## 2021-06-27 PROCEDURE — 6370000000 HC RX 637 (ALT 250 FOR IP): Performed by: PHYSICAL MEDICINE & REHABILITATION

## 2021-06-27 PROCEDURE — 1280000000 HC REHAB R&B

## 2021-06-27 PROCEDURE — 36415 COLL VENOUS BLD VENIPUNCTURE: CPT

## 2021-06-27 PROCEDURE — 85025 COMPLETE CBC W/AUTO DIFF WBC: CPT

## 2021-06-27 PROCEDURE — 80048 BASIC METABOLIC PNL TOTAL CA: CPT

## 2021-06-27 PROCEDURE — 83735 ASSAY OF MAGNESIUM: CPT

## 2021-06-27 RX ADMIN — Medication 1 CAPSULE: at 07:24

## 2021-06-27 RX ADMIN — LEVOFLOXACIN 250 MG: 250 TABLET, FILM COATED ORAL at 17:20

## 2021-06-27 RX ADMIN — ATORVASTATIN CALCIUM 40 MG: 40 TABLET, FILM COATED ORAL at 20:39

## 2021-06-27 RX ADMIN — INSULIN LISPRO 5 UNITS: 100 INJECTION, SOLUTION INTRAVENOUS; SUBCUTANEOUS at 07:25

## 2021-06-27 RX ADMIN — LEVOTHYROXINE SODIUM 125 MCG: 0.12 TABLET ORAL at 05:54

## 2021-06-27 RX ADMIN — SODIUM CHLORIDE, PRESERVATIVE FREE 10 ML: 5 INJECTION INTRAVENOUS at 20:38

## 2021-06-27 RX ADMIN — INSULIN LISPRO 1 UNITS: 100 INJECTION, SOLUTION INTRAVENOUS; SUBCUTANEOUS at 16:40

## 2021-06-27 RX ADMIN — SODIUM CHLORIDE, PRESERVATIVE FREE 10 ML: 5 INJECTION INTRAVENOUS at 08:42

## 2021-06-27 RX ADMIN — METOPROLOL TARTRATE 50 MG: 50 TABLET, FILM COATED ORAL at 08:39

## 2021-06-27 RX ADMIN — METOPROLOL TARTRATE 50 MG: 50 TABLET, FILM COATED ORAL at 20:38

## 2021-06-27 RX ADMIN — Medication 1 CAPSULE: at 16:39

## 2021-06-27 RX ADMIN — INSULIN GLARGINE 15 UNITS: 100 INJECTION, SOLUTION SUBCUTANEOUS at 20:38

## 2021-06-27 RX ADMIN — PANTOPRAZOLE SODIUM 40 MG: 40 TABLET, DELAYED RELEASE ORAL at 05:54

## 2021-06-27 RX ADMIN — INSULIN LISPRO 5 UNITS: 100 INJECTION, SOLUTION INTRAVENOUS; SUBCUTANEOUS at 16:42

## 2021-06-27 RX ADMIN — NIFEDIPINE 90 MG: 90 TABLET, FILM COATED, EXTENDED RELEASE ORAL at 08:39

## 2021-06-27 ASSESSMENT — PAIN SCALES - GENERAL
PAINLEVEL_OUTOF10: 0
PAINLEVEL_OUTOF10: 0

## 2021-06-27 NOTE — PLAN OF CARE
Problem: Falls - Risk of:  Goal: Will remain free from falls  Description: Will remain free from falls  Outcome: Ongoing  Note: Family can assist with transfer, will call if family not here, no device needed. Problem: Infection:  Goal: Will remain free from infection  Description: Will remain free from infection  Outcome: Ongoing  Note: No issues noted with IV site, UTI resolving. Problem: Daily Care:  Goal: Daily care needs are met  Description: Daily care needs are met  Outcome: Ongoing  Note:  assist with care. Problem: Pain:  Goal: Patient's pain/discomfort is manageable  Description: Patient's pain/discomfort is manageable  Outcome: Ongoing  Note: Has denied pain thus far today. Problem: Skin Integrity:  Goal: Skin integrity will stabilize  Description: Skin integrity will stabilize  Outcome: Ongoing  Note: No issues noted. Monitor. Problem: Discharge Planning:  Goal: Patients continuum of care needs are met  Description: Patients continuum of care needs are met  Outcome: Ongoing  Note: Hopeful for discharge Monday. Problem: Metabolic:  Goal: Ability to maintain appropriate glucose levels will improve  Description: Ability to maintain appropriate glucose levels will improve  Outcome: Ongoing  Note: Blood sugars improving, on mealtime insulin which is new, monitor.  States did attend classes for diabetes in the past.

## 2021-06-28 VITALS
BODY MASS INDEX: 24.38 KG/M2 | HEIGHT: 63 IN | DIASTOLIC BLOOD PRESSURE: 73 MMHG | RESPIRATION RATE: 16 BRPM | SYSTOLIC BLOOD PRESSURE: 157 MMHG | WEIGHT: 137.57 LBS | HEART RATE: 85 BPM | OXYGEN SATURATION: 94 % | TEMPERATURE: 98.5 F

## 2021-06-28 LAB
ANION GAP SERPL CALCULATED.3IONS-SCNC: 14 MMOL/L (ref 3–16)
BASOPHILS ABSOLUTE: 0.1 K/UL (ref 0–0.2)
BASOPHILS RELATIVE PERCENT: 1.3 %
BUN BLDV-MCNC: 49 MG/DL (ref 7–20)
CALCIUM SERPL-MCNC: 8 MG/DL (ref 8.3–10.6)
CHLORIDE BLD-SCNC: 108 MMOL/L (ref 99–110)
CO2: 16 MMOL/L (ref 21–32)
CREAT SERPL-MCNC: 2.2 MG/DL (ref 0.6–1.2)
EOSINOPHILS ABSOLUTE: 0.1 K/UL (ref 0–0.6)
EOSINOPHILS RELATIVE PERCENT: 1.5 %
GFR AFRICAN AMERICAN: 27
GFR NON-AFRICAN AMERICAN: 22
GLUCOSE BLD-MCNC: 107 MG/DL (ref 70–99)
GLUCOSE BLD-MCNC: 111 MG/DL (ref 70–99)
GLUCOSE BLD-MCNC: 178 MG/DL (ref 70–99)
GLUCOSE BLD-MCNC: 96 MG/DL (ref 70–99)
HCT VFR BLD CALC: 24.5 % (ref 36–48)
HEMOGLOBIN: 8.3 G/DL (ref 12–16)
LYMPHOCYTES ABSOLUTE: 1 K/UL (ref 1–5.1)
LYMPHOCYTES RELATIVE PERCENT: 10.5 %
MAGNESIUM: 2 MG/DL (ref 1.8–2.4)
MCH RBC QN AUTO: 29.3 PG (ref 26–34)
MCHC RBC AUTO-ENTMCNC: 34.1 G/DL (ref 31–36)
MCV RBC AUTO: 86.1 FL (ref 80–100)
MONOCYTES ABSOLUTE: 0.5 K/UL (ref 0–1.3)
MONOCYTES RELATIVE PERCENT: 5.7 %
NEUTROPHILS ABSOLUTE: 7.5 K/UL (ref 1.7–7.7)
NEUTROPHILS RELATIVE PERCENT: 81 %
PDW BLD-RTO: 14.4 % (ref 12.4–15.4)
PERFORMED ON: ABNORMAL
PERFORMED ON: ABNORMAL
PERFORMED ON: NORMAL
PLATELET # BLD: 316 K/UL (ref 135–450)
PMV BLD AUTO: 6.3 FL (ref 5–10.5)
POTASSIUM SERPL-SCNC: 4.7 MMOL/L (ref 3.5–5.1)
PROCALCITONIN: 0.31 NG/ML (ref 0–0.15)
RBC # BLD: 2.85 M/UL (ref 4–5.2)
REASON FOR REJECTION: NORMAL
REJECTED TEST: NORMAL
SODIUM BLD-SCNC: 138 MMOL/L (ref 136–145)
WBC # BLD: 9.3 K/UL (ref 4–11)

## 2021-06-28 PROCEDURE — 94761 N-INVAS EAR/PLS OXIMETRY MLT: CPT

## 2021-06-28 PROCEDURE — 97116 GAIT TRAINING THERAPY: CPT | Performed by: PHYSICAL THERAPIST

## 2021-06-28 PROCEDURE — 36415 COLL VENOUS BLD VENIPUNCTURE: CPT

## 2021-06-28 PROCEDURE — 97110 THERAPEUTIC EXERCISES: CPT | Performed by: PHYSICAL THERAPIST

## 2021-06-28 PROCEDURE — 80048 BASIC METABOLIC PNL TOTAL CA: CPT

## 2021-06-28 PROCEDURE — 85025 COMPLETE CBC W/AUTO DIFF WBC: CPT

## 2021-06-28 PROCEDURE — 97530 THERAPEUTIC ACTIVITIES: CPT

## 2021-06-28 PROCEDURE — 6370000000 HC RX 637 (ALT 250 FOR IP): Performed by: PHYSICAL MEDICINE & REHABILITATION

## 2021-06-28 PROCEDURE — 97535 SELF CARE MNGMENT TRAINING: CPT

## 2021-06-28 PROCEDURE — 84145 PROCALCITONIN (PCT): CPT

## 2021-06-28 PROCEDURE — 97530 THERAPEUTIC ACTIVITIES: CPT | Performed by: PHYSICAL THERAPIST

## 2021-06-28 PROCEDURE — 2580000003 HC RX 258: Performed by: PHYSICAL MEDICINE & REHABILITATION

## 2021-06-28 PROCEDURE — 83735 ASSAY OF MAGNESIUM: CPT

## 2021-06-28 RX ORDER — NIFEDIPINE 90 MG/1
90 TABLET, EXTENDED RELEASE ORAL DAILY
Qty: 30 TABLET | Refills: 3 | Status: SHIPPED | OUTPATIENT
Start: 2021-06-29 | End: 2021-11-19

## 2021-06-28 RX ORDER — LEVOFLOXACIN 250 MG/1
250 TABLET ORAL
Qty: 10 TABLET | Refills: 0 | Status: SHIPPED | OUTPATIENT
Start: 2021-06-28 | End: 2021-07-08

## 2021-06-28 RX ORDER — LACTOBACILLUS RHAMNOSUS GG 10B CELL
1 CAPSULE ORAL 2 TIMES DAILY WITH MEALS
Qty: 60 CAPSULE | Refills: 1 | Status: SHIPPED | OUTPATIENT
Start: 2021-06-28

## 2021-06-28 RX ORDER — LIDOCAINE 4 G/G
1 PATCH TOPICAL DAILY PRN
Qty: 30 PATCH | Refills: 1 | Status: SHIPPED | OUTPATIENT
Start: 2021-06-28 | End: 2021-11-19

## 2021-06-28 RX ADMIN — METOPROLOL TARTRATE 50 MG: 50 TABLET, FILM COATED ORAL at 07:39

## 2021-06-28 RX ADMIN — Medication 1 CAPSULE: at 07:39

## 2021-06-28 RX ADMIN — PANTOPRAZOLE SODIUM 40 MG: 40 TABLET, DELAYED RELEASE ORAL at 05:55

## 2021-06-28 RX ADMIN — LEVOTHYROXINE SODIUM 125 MCG: 0.12 TABLET ORAL at 05:55

## 2021-06-28 RX ADMIN — INSULIN LISPRO 5 UNITS: 100 INJECTION, SOLUTION INTRAVENOUS; SUBCUTANEOUS at 11:37

## 2021-06-28 RX ADMIN — INSULIN LISPRO 5 UNITS: 100 INJECTION, SOLUTION INTRAVENOUS; SUBCUTANEOUS at 07:39

## 2021-06-28 RX ADMIN — SODIUM CHLORIDE, PRESERVATIVE FREE 10 ML: 5 INJECTION INTRAVENOUS at 09:27

## 2021-06-28 RX ADMIN — NIFEDIPINE 90 MG: 90 TABLET, FILM COATED, EXTENDED RELEASE ORAL at 07:39

## 2021-06-28 RX ADMIN — INSULIN LISPRO 1 UNITS: 100 INJECTION, SOLUTION INTRAVENOUS; SUBCUTANEOUS at 11:36

## 2021-06-28 ASSESSMENT — PAIN SCALES - GENERAL
PAINLEVEL_OUTOF10: 0
PAINLEVEL_OUTOF10: 0

## 2021-06-28 NOTE — PLAN OF CARE
Problem: Falls - Risk of:  Goal: Will remain free from falls  Description: Will remain free from falls  Outcome: Completed  Goal: Absence of physical injury  Description: Absence of physical injury  Outcome: Completed     Problem: Infection:  Goal: Will remain free from infection  Description: Will remain free from infection  Outcome: Completed     Problem: Safety:  Goal: Free from accidental physical injury  Description: Free from accidental physical injury  Outcome: Completed  Goal: Free from intentional harm  Description: Free from intentional harm  Outcome: Completed     Problem: Daily Care:  Goal: Daily care needs are met  Description: Daily care needs are met  Outcome: Completed     Problem: Pain:  Goal: Patient's pain/discomfort is manageable  Description: Patient's pain/discomfort is manageable  Outcome: Completed     Problem: Skin Integrity:  Goal: Skin integrity will stabilize  Description: Skin integrity will stabilize  Outcome: Completed     Problem: Discharge Planning:  Goal: Patients continuum of care needs are met  Description: Patients continuum of care needs are met  Outcome: Completed     Problem: ABCDS Injury Assessment  Goal: Absence of physical injury  Outcome: Completed     Problem: Metabolic:  Goal: Ability to maintain appropriate glucose levels will improve  Description: Ability to maintain appropriate glucose levels will improve  Outcome: Completed

## 2021-06-28 NOTE — PROGRESS NOTES
Physical Therapy  Facility/Department: [address] IP REHAB  Daily Treatment Note- AM   Discharge Summary  NAME: Doneta Hamman  : 1955  MRN: [id number]    Date of Service: 2021    Discharge Recommendations:  Home with assist PRN   PT Equipment Recommendations  Equipment Needed: No    Assessment   Assessment: Pt is a 74 y.o. F. admitted  for SILVESTRE, Anemia; UGI bleed. Today , pt feels better with improved tolerance. Patient has met all LTG. Patient's hemoglobin numbers are stable. Patient independent with mobility without AD with no LOB. Pt presenting with visually less SOB with exertion and reports her breathing is feeling much better. Patient given written HEP. .Patient to be D/C today ,, with  and prefers no outpatient therapy. PT Education: Goals; General Safety;PT Role;Orientation; Functional Mobility Training;Plan of Care;Home Exercise Program;Precautions;Transfer Training  Patient Education: HEP, deep breathing exercisise  Activity Tolerance  Activity Tolerance: Patient Tolerated treatment well  Activity Tolerance: tolerance improved today     Patient Diagnosis(es): There were no encounter diagnoses. has a past medical history of CAD (coronary artery disease), Diabetes mellitus ([facility] [address] Hyperlipidemia, Shingles, and Thyroid disease. has a past surgical history that includes eye surgery; sinus surgery; and Cholecystectomy. Restrictions  Restrictions/Precautions  Restrictions/Precautions: Fall Risk  Position Activity Restriction  Other position/activity restrictions: PICC line  Subjective   General  Chart Reviewed: Yes  Additional Pertinent Hx: Per Dr. Pool Arana, \"Patient is a 75 yo F with pmh DM2 and hypothyroidism who intitially presented to [facility] on 2021 with dark diarrhea, n/v, and progressive weakness. She did have a fall prior to presenting to the ED, fell backwards and did hit the back of her head. Found to have upper GI bleed.  EGD revealed ulcer at GE junction, minimal gastric erosion in the antrum. She was started on ppi and Venofer. Of note, patient has been taking NSAIDs daily for 3 months due to back pain related to T12 compression fracture. Course was complicated by SILVESTRE, NSTEMI, DD2 on TTE. 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 Now presents to ARU with impaired mobility and self-care below her baseline. Currently, patient reports generalized weakness and fatigue. She has mild back pain. She denies abdominal pain, n/v, blood in stool. She is motivated to start inpatient rehab program.\"  Response To Previous Treatment: Patient with no complaints from previous session. Referring Practitioner: Dr. Vikki Allred: Patient feels ready to go home with no concerns. Patient with no pain. Orientation  Orientation  Overall Orientation Status: Within Normal Limits  Cognition      Objective   Bed mobility  Bridging: Independent  Rolling to Left: Independent  Rolling to Right: Independent  Supine to Sit: Independent  Sit to Supine: Independent  Scooting: Independent  Comment: flat bed no rails, good log roll  Transfers  Sit to Stand: Independent  Stand to sit:  Independent  Bed to Chair: Independent  Car Transfer: Independent (walk up to car, sit first, swing legs in/out, stand without need for UE assist)  Ambulation  Ambulation?: Yes  Ambulation 1  Surface: level tile;carpet;uneven  Device: No Device  Assistance: Independent (for safety)  Quality of Gait: step-through pattern, only occasional deviation but no LOB  Gait Deviations: Deviated path  Distance: 500', 150' several turns and over doorsills, carpet  Comments: slight dyspnea with longer distance  Stairs/Curb  Stairs?: Yes  Stairs  # Steps : 12  Stairs Height: 6\"  Rails: Right ascending  Curbs: 6\" (independent without device; SOB following SpO2 92%)  Device: No Device  Assistance: Modified independent   Comment: Patient MI on steps and curb step , minimal dyspnea     Balance  Posture: Good  Sitting - Static: Good  Sitting - Dynamic: Good  Standing - Static: Good  Standing - Dynamic: Good  Comments: Patient with no LOB without device  Exercises  Comments: Patient given written HEP in stand, heelrocks/toe raises x 15, mini squats x 15, knee flexion x 10 with B LE, hip abduction and extension x 10 cues for upright posture         Other Activities: Other (see comment)  Comment: Patient able to  object with independence using firm surface, aware of need for back precautions        Goals  Short term goals  Time Frame for Short term goals: In 5 days pt will perform  Short term goal 1: Bed mobility (I) met 6/24  Short term goal 2: Transfers (I)- met 6/24  Short term goal 3: Ambulation 500' (I) without device- met 6/28  Short term goal 4: Ascend/Descend 12 steps with single HR support, mod I, reciprocal pattern -met 6/24  Short term goal 5: Ascend/Descend curb step independently without device - met 6/24  Long term goals  Time Frame for Long term goals : LTG = STG  Patient Goals   Patient goals : To improve her balance and core strength    Plan    Plan  Times per week: 5-6x  Times per day: Twice a day  Plan weeks: 5 days  Specific instructions for Next Treatment: Tandem stance; single-leg stand; sit < > stand without UE support  Current Treatment Recommendations: Strengthening, Balance Training, Endurance Training, Functional Mobility Training, Transfer Training, Gait Training, Stair training, Positioning, Equipment Evaluation, Education, & procurement, Patient/Caregiver Education & Training, Safety Education & Training, Home Exercise Program, Neuromuscular Re-education  Plan Comment: patient to be D/C,6/28 home with  with no further therapy, patient given HEP  Safety Devices  Type of devices:  All fall risk precautions in place, Gait belt, Nurse notified (left in room with her  and daughter)  Restraints  Initially in place: No     Therapy Time Individual Concurrent Group Co-treatment   Time In 0945         Time Out 1030         Minutes 45         Timed Code Treatment Minutes: 2805 Ulises Zaragoza Jr [facility] Any Salgado, PT # [id number]

## 2021-06-28 NOTE — DISCHARGE SUMMARY
Physical Medicine & Rehabilitation  Discharge Summary     Patient Identification:  Chantelle Gonzales  : 1955  Admit date: 2021  Discharge date: 2021   Attending provider: Dae Hooker MD        Primary care provider: Edgar Keen     Discharge Diagnoses:   Patient Active Problem List   Diagnosis    Upper GI bleed    HAP (hospital-acquired pneumonia)    SILVESTRE (acute kidney injury) ([facility] [address]    Leukocytosis       History of Present Illness/Acute Hospital Course:  Patient is a 73 yo F with pmh DM2 and hypothyroidism who intitially presented to [facility] on 2021 with dark diarrhea, n/v, and progressive weakness. She did have a fall prior to presenting to the ED, fell backwards and did hit the back of her head. Found to have upper GI bleed. EGD revealed ulcer at GE junction, minimal gastric erosion in the antrum. She was started on ppi and Venofer. Of note, patient has been taking NSAIDs daily for 3 months due to back pain related to T12 compression fracture. Course was complicated by SILVESTRE, NSTEMI, DD2 on TTE. 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 Now presents to ARU with impaired mobility and self-care below her baseline. Inpatient Rehabilitation Course:   Chantelle Gonzales is a 74 y.o. female admitted to inpatient rehabilitation on 2021 with debility due to Upper GI bleed. The patient participated in an aggressive multidisciplinary inpatient rehabilitation program involving 3 hours of therapy per day, at least 5 days per week. She made fast progress with regard to strength, balance, endurance but her rehab length of stay was extended due to medical issues including HAP (see below). Now overall modified independent. Discharging home with . She has declined home care services and outpatient PT. She will follow-up with PCP, Nephrology, GI.     Medical Management:    Upper GI bleed  -EGD with gastric ulcer, antral erosions  -NSAIDs dc'ed  -ppi     Acute blood loss anemia   -Due to GIB.   -Hgb now stable >8 (last transfusion 6/23 with appropriate response).    SILVESTRE  -Possibly AIN per [facility].  -Nephrology consulted here as well  -Avoid nephrotoxins, renally dose meds  -Cr remains elevated ~2    Leukocytosis with eosinophilia  -Per [facility], possibly due to AIN  -WBC remained increased on admission to ARU, no longer with eosinophilia. Work-up initiated:  ---CXR negative, procalcitonin elevated  ---Blood cultures negative  ---Lactate wnl  ---UA/culture positive, treated UTI as below  ---Repeat work-up ordered 6/23 due to new onset hypoxia and shortness of breath. CT chest concerning for pneumonia, treated as below)  -WBC now normalizing      HAP  -Started cefepime + vanc (6/23) --> transitioned to levofloxacin to complete course  -MRSA swab negative  -covid negative  -Clinically much improved, no longer with shortness of breath. O2 sats remain stable on RA. Globus sensation, dysphonia  -ENT consult appreciated  -No abnormality on laryngoscopy  -Possible laryngospasm related to pneumonia, no intervention recommended     Pseudomonas UTI  -Abx for pneumonia as above will cover for this as well     Subacute T12 compression fracture  -Pain control with acetaminophen and lidoderm prn  -PT/OT     NSTEMI type II  -statin, bb     Diastolic dysfunction  -Daily wt stable     HTN  -metoprolol (increased dose), nifedipine (increased dose) -- trend improved     DM2  -Lantus 15 qhs, prandial 5 TID, ISS     Hypothyroidism  -levothyroxine       Discharge Exam:  Const: Alert. No distress, pleasant. HEENT: Normocephalic, atraumatic. Normal sclera/conjunctiva. MMM. CV: Regular rate and rhythm. Resp: No respiratory distress. Lungs CTAB. Abd: Soft, nontender, nondistended, NABS+   Ext: +LE edema  Neuro: Alert, oriented, appropriately interactive.    Psych: Cooperative, appropriate mood and affect         Discharge Functional Status:    Physical Component Value Date    WBC 9.3 06/28/2021    HGB 8.3 (L) 06/28/2021    HCT 24.5 (L) 06/28/2021    MCV 86.1 06/28/2021     06/28/2021       Disposition:  home with   Services: declined home care and outpatient PT  DME: shower chair    Discharge Condition: Stable    Follow-up:  See after visit summary from hospitalization    Discharge Medications:     Medication List      START taking these medications    lactobacillus capsule  Take 1 capsule by mouth 2 times daily (with meals)     levoFLOXacin 250 MG tablet  Commonly known as: LEVAQUIN  Take 1 tablet by mouth Daily with supper for 10 days     lidocaine 4 % external patch  Place 1 patch onto the skin daily as needed (back pain)        CHANGE how you take these medications    NIFEdipine 90 MG extended release tablet  Commonly known as: PROCARDIA XL  Take 1 tablet by mouth daily  Start taking on: June 29, 2021  What changed:   · medication strength  · how much to take        CONTINUE taking these medications    acetaminophen 325 MG tablet  Commonly known as: TYLENOL     atorvastatin 40 MG tablet  Commonly known as: LIPITOR     azelastine 0.1 % nasal spray  Commonly known as: ASTELIN     cetirizine 10 MG tablet  Commonly known as: ZYRTEC     insulin glargine 100 UNIT/ML injection vial  Commonly known as: LANTUS     metoprolol tartrate 50 MG tablet  Commonly known as: LOPRESSOR     NovoLOG 100 UNIT/ML injection vial  Generic drug: insulin aspart     pantoprazole 40 MG tablet  Commonly known as: PROTONIX     polyethylene glycol 17 g packet  Commonly known as: GLYCOLAX     senna 8.6 MG tablet  Commonly known as: SENOKOT     Synthroid 125 MCG tablet  Generic drug: levothyroxine     vitamin C 250 MG tablet     vitamin D 25 MCG (1000 UT) Tabs tablet  Commonly known as: CHOLECALCIFEROL     Zofran 4 MG tablet  Generic drug: ondansetron        STOP taking these medications    gabapentin 300 MG capsule  Commonly known as: NEURONTIN     ibuprofen 600 MG tablet  Commonly known as: ADVIL;MOTRIN     metoprolol succinate 50 MG extended release tablet  Commonly known as: TOPROL XL           Where to Get Your Medications      You can get these medications from any pharmacy    Bring a paper prescription for each of these medications  · lactobacillus capsule  · levoFLOXacin 250 MG tablet  · lidocaine 4 % external patch  · NIFEdipine 90 MG extended release tablet           I spent over 35 minutes on this discharge encounter between counseling, coordination of care, and medication reconciliation. To comply with [facility] by R.II.4.1:   Discharge order placed in advance to facilitate patients discharge needs.       Wally Lala MD

## 2021-06-28 NOTE — PROGRESS NOTES
CLINICAL PHARMACY NOTE: MEDS TO BEDS    Total # of Prescriptions Filled: 3   The following medications were delivered to the patient:  Discharge Medication List as of 6/28/2021 11:06 AM      START taking these medications    Details   lactobacillus (CULTURELLE) capsule Take 1 capsule by mouth 2 times daily (with meals), Disp-60 capsule, R-1Print      lidocaine 4 % external patch Place 1 patch onto the skin daily as needed (back pain), Transdermal, DAILY PRN Starting Mon 6/28/2021, Disp-30 patch, R-1, Print      levoFLOXacin (LEVAQUIN) 250 MG tablet Take 1 tablet by mouth Daily with supper for 10 days, Disp-10 tablet, R-0Print         ·     Additional Documentation:

## 2021-06-28 NOTE — PROGRESS NOTES
Department of Physical Medicine & Rehabilitation  Progress Note    Patient Identification:  Tyrel Phan  [id number]  : 1955  Admit date: 2021    Chief Complaint: Upper GI bleed    Subjective: Patient seen this AM.  Patient is feeling better, thinks she is ready for discharge to home today. Repeat labs this morning show stable H&H. Patient states her breathing has improved over the weekend, and she is tolerating the Levaquin well. We will have her continue Levaquin after discharge. She will be discharged to home today in the care of the , and she will continue home therapies after discharge. ROS: No f/c, n/v, cp     Objective:  Patient Vitals for the past 24 hrs:   BP Temp Temp src Pulse Resp SpO2 Weight   21 0831 -- -- -- -- -- 94 % --   21 0730 (!) 157/73 98.5 °F (36.9 °C) Oral 85 16 -- --   21 0545 -- -- -- -- -- -- 137 lb 9.1 oz (62.4 kg)   21 0426 (!) 157/75 98.5 °F (36.9 °C) Oral 77 16 92 % --   21 0002 125/60 98 °F (36.7 °C) Oral 71 18 92 % --   217 (!) 167/78 98.3 °F (36.8 °C) Oral 90 18 93 % --   21 1718 (!) 150/70 98.9 °F (37.2 °C) Oral 82 18 96 % --   21 1712 -- -- -- -- 16 93 % --   21 1325 133/61 98 °F (36.7 °C) Oral 75 18 94 % --     Const: Alert. No distress, pleasant. HEENT: Normocephalic, atraumatic. Normal sclera/conjunctiva. MMM. CV: Regular rate and rhythm. Resp: No respiratory distress. Lungs CTAB. Abd: Soft, nontender, nondistended, NABS+   Ext: +LE edema  Neuro: Alert, oriented, appropriately interactive. Psych: Cooperative, appropriate mood and affect    Laboratory data: Available via EMR.    Last 24 hour lab  Recent Results (from the past 24 hour(s))   POCT Glucose    Collection Time: 21 11:20 AM   Result Value Ref Range    POC Glucose 91 70 - 99 mg/dl    Performed on ACCU-CHEK    POCT Glucose    Collection Time: 21  4:10 PM   Result Value Ref Range    POC Glucose 145 (H) 70 - 99 mg/dl    Performed on ACCU-CHEK    POCT Glucose    Collection Time: 06/27/21  8:36 PM   Result Value Ref Range    POC Glucose 91 70 - 99 mg/dl    Performed on ACCU-CHEK    POCT Glucose    Collection Time: 06/28/21  1:47 AM   Result Value Ref Range    POC Glucose 96 70 - 99 mg/dl    Performed on ACCU-CHEK    Basic Metabolic Panel    Collection Time: 06/28/21  6:18 AM   Result Value Ref Range    Sodium 138 136 - 145 mmol/L    Potassium 4.7 3.5 - 5.1 mmol/L    Chloride 108 99 - 110 mmol/L    CO2 16 (L) 21 - 32 mmol/L    Anion Gap 14 3 - 16    Glucose 107 (H) 70 - 99 mg/dL    BUN 49 (H) 7 - 20 mg/dL    CREATININE 2.2 (H) 0.6 - 1.2 mg/dL    GFR Non-African American 22 (A) >60    GFR  27 (A) >60    Calcium 8.0 (L) 8.3 - 10.6 mg/dL   Magnesium    Collection Time: 06/28/21  6:18 AM   Result Value Ref Range    Magnesium 2.00 1.80 - 2.40 mg/dL   Procalcitonin    Collection Time: 06/28/21  6:18 AM   Result Value Ref Range    Procalcitonin 0.31 (H) 0.00 - 0.15 ng/mL   POCT Glucose    Collection Time: 06/28/21  6:47 AM   Result Value Ref Range    POC Glucose 111 (H) 70 - 99 mg/dl    Performed on ACCU-CHEK    SPECIMEN REJECTION    Collection Time: 06/28/21  7:01 AM   Result Value Ref Range    Rejected Test cbcwd     Reason for Rejection see below    CBC Auto Differential    Collection Time: 06/28/21  7:47 AM   Result Value Ref Range    WBC 9.3 4.0 - 11.0 K/uL    RBC 2.85 (L) 4.00 - 5.20 M/uL    Hemoglobin 8.3 (L) 12.0 - 16.0 g/dL    Hematocrit 24.5 (L) 36.0 - 48.0 %    MCV 86.1 80.0 - 100.0 fL    MCH 29.3 26.0 - 34.0 pg    MCHC 34.1 31.0 - 36.0 g/dL    RDW 14.4 12.4 - 15.4 %    Platelets 901 303 - 787 K/uL    MPV 6.3 5.0 - 10.5 fL    Neutrophils % 81.0 %    Lymphocytes % 10.5 %    Monocytes % 5.7 %    Eosinophils % 1.5 %    Basophils % 1.3 %    Neutrophils Absolute 7.5 1.7 - 7.7 K/uL    Lymphocytes Absolute 1.0 1.0 - 5.1 K/uL    Monocytes Absolute 0.5 0.0 - 1.3 K/uL    Eosinophils Absolute 0.1 0.0 - 0.6 K/uL Basophils Absolute 0.1 0.0 - 0.2 K/uL       Therapy progress:  PT  Position Activity Restriction  Other position/activity restrictions: PICC line  Objective     Sit to Stand: Independent  Stand to sit: Independent  Bed to Chair: Supervision  Device: No Device  Assistance: Stand by assistance (for safety)  Distance: 250';  OT  PT Equipment Recommendations  Equipment Needed: No  Toilet - Technique: Ambulating  Equipment Used: Grab bars  Toilet Transfers Comments: no device  Assessment        SLP          Body mass index is 24.37 kg/m². Rehabilitation Diagnosis:   16.0, Debility (non-cardiac, non-pulmonary        Assessment and Plan:     Impairments:  generalized weakness, decreased balance, endurance, spine ROM     Debility  -PT/OT     Upper GI bleed  -EGD with gastric ulcer, antral erosions  -NSAIDs dc'ed  -ppi     Acute blood loss anemia   -Due to GIB. -Monitor Hgb, transfuse prn <7. Remains stable s/p 1 unit pRBCs on 6/23.      SILVESTRE  -Possibly AIN per [facility]  -Avoid nephrotoxins, renally dose meds  -Monitor renal function.   -Nephrology consulted.      Leukocytosis with eosinophilia  -Per [facility], possibly due to AIN  -WBC remained increased on admission to ARU, no longer with eosinophilia. Work-up initiated:  ---CXR negative, procalcitonin elevated  ---Blood cultures negative  ---Lactate wnl  ---UA/culture positive, treating UTI as below  ---Repeat work-up ordered 6/23 due to new onset hypoxia and shortness of breath. CT chest concerning for pneumonia. HAP  -Started cefepime + vanc (6/23) --> Will dc vanc as MRSA swab negative.  As VS are stable, will transition to po levofloxacin and observe over the weekend to ensure ongoing improvement.  -Covid negative    Globus sensation, dysphonia  -ENT consult appreciated  -No abnormality on laryngoscopy  -Possible laryngospasm related to pneumonia, no intervention recommended    Pseudomonas UTI  -Abx for pneumonia as above will cover for this as well     Subacute T12 compression fracture  -Pain control with acetaminophen and lidoderm prn  -PT/OT    NSTEMI type II  -statin, bb     Diastolic dysfunction  -Daily wt     HTN  -metoprolol (increased dose), nifedipine (increased dose) -- trend improved     DM2  -Lantus 15 qhs, prandial 5 TID, ISS     Hypothyroidism  -levothyroxine     Bladder   -High risk retention   -Monitor PVRs, SC prn >300cc     Bowel   -High risk constipation   -senna+colace BID, PRN miralax, MoM, and bisacodyl supp.     Safety   -fall precautions     PPx  -DVT: Hold due to bleed risk  -GI: pantoprazole       Rehab Progress: Making progress. Working on strength, endurance, balance. Anticipated Dispo: home with   Services: outpatient PT  DME: shower chair  ELOS: Discharge today      Mynor Brandt MD 6/28/2021, 10:26 AM

## 2021-06-28 NOTE — PROGRESS NOTES
OT arrival. No reports of pain, dizziness or SOB  General Comment  Comments: agreeable to shower      Orientation  Orientation  Overall Orientation Status: Within Functional Limits  Orientation Level: Oriented X4  Objective    ADL  Grooming: Modified independent   UE Bathing: Modified independent   LE Bathing: Modified independent   UE Dressing: Modified independent   LE Dressing: Modified independent: including socks and shoes  Toileting: Modified independent         Balance  Sitting Balance: Independent  Standing Balance: Modified independent   Standing Balance  Activity: Stood for ADL tasks  Functional Mobility  Functional - Mobility Device: No device  Activity: To/from bathroom  Assist Level: Modified independent   Functional Mobility Comments: Functional mobility without device with Mod I, no overt LOB noted  Toilet Transfers  Toilet - Technique: Ambulating  Equipment Used: Grab bars  Toilet Transfer: Modified independent  Toilet Transfers Comments: no device  Shower Transfers  Shower - Transfer From: Other (no device)  Shower - Transfer Type: To and From  Shower - Transfer To: Standing  Shower - Technique: Ambulating  Shower Transfers: Modified independence  Shower Transfers Comments: Pt able to perform shower transfer w/ mod I and no support from walls or chair  Bed mobility  Supine to Sit: Independent  Sit to Supine: Independent  Transfers  Sit to stand: Modified independent  Stand to sit: Modified independent  Transfer Comments: to/from     Cognition  Overall Cognitive Status: WNL     Assessment   Performance deficits / Impairments: Decreased functional mobility ; Decreased endurance;Decreased ADL status; Decreased high-level IADLs;Decreased balance  Assessment: Patient completed functional mobility without device Mod I. Transfer to commode, bed and in/out of shower Mod I. Completed shower in standing Mod I. Dressed upper and lower body Mod I. Patient without reports of SOB, pain or dizziness during session.

## 2021-11-17 ENCOUNTER — HOSPITAL ENCOUNTER (OUTPATIENT)
Dept: INFUSION THERAPY | Age: 66
Setting detail: INFUSION SERIES
Discharge: HOME | End: 2021-11-17
Payer: MEDICARE

## 2021-11-17 DIAGNOSIS — N18.9 ANEMIA OF CHRONIC RENAL FAILURE, UNSPECIFIED CKD STAGE: Primary | ICD-10-CM

## 2021-11-17 DIAGNOSIS — D63.1 ANEMIA OF CHRONIC RENAL FAILURE, UNSPECIFIED CKD STAGE: Primary | ICD-10-CM

## 2021-11-17 LAB
ABO/RH: NORMAL
ANTIBODY SCREEN: NORMAL

## 2021-11-17 PROCEDURE — 86850 RBC ANTIBODY SCREEN: CPT

## 2021-11-17 PROCEDURE — 86923 COMPATIBILITY TEST ELECTRIC: CPT

## 2021-11-17 PROCEDURE — 36415 COLL VENOUS BLD VENIPUNCTURE: CPT

## 2021-11-17 PROCEDURE — 86901 BLOOD TYPING SEROLOGIC RH(D): CPT

## 2021-11-17 PROCEDURE — 86900 BLOOD TYPING SEROLOGIC ABO: CPT

## 2021-11-17 PROCEDURE — P9016 RBC LEUKOCYTES REDUCED: HCPCS

## 2021-11-17 RX ORDER — EPINEPHRINE 1 MG/ML
0.3 INJECTION, SOLUTION, CONCENTRATE INTRAVENOUS PRN
Status: CANCELLED | OUTPATIENT
Start: 2021-11-17

## 2021-11-17 RX ORDER — ONDANSETRON 2 MG/ML
8 INJECTION INTRAMUSCULAR; INTRAVENOUS
OUTPATIENT
Start: 2021-11-17

## 2021-11-17 RX ORDER — SODIUM CHLORIDE 9 MG/ML
INJECTION, SOLUTION INTRAVENOUS CONTINUOUS
OUTPATIENT
Start: 2021-11-17

## 2021-11-17 RX ORDER — DIPHENHYDRAMINE HCL 25 MG
25 TABLET ORAL ONCE
Status: CANCELLED | OUTPATIENT
Start: 2021-11-17 | End: 2021-11-17

## 2021-11-17 RX ORDER — DIPHENHYDRAMINE HYDROCHLORIDE 50 MG/ML
50 INJECTION INTRAMUSCULAR; INTRAVENOUS
Status: CANCELLED | OUTPATIENT
Start: 2021-11-17

## 2021-11-17 RX ORDER — ACETAMINOPHEN 325 MG/1
650 TABLET ORAL
Status: CANCELLED | OUTPATIENT
Start: 2021-11-17

## 2021-11-17 RX ORDER — ACETAMINOPHEN 325 MG/1
650 TABLET ORAL ONCE
Status: CANCELLED | OUTPATIENT
Start: 2021-11-17 | End: 2021-11-17

## 2021-11-17 RX ORDER — SODIUM CHLORIDE 0.9 % (FLUSH) 0.9 %
5-40 SYRINGE (ML) INJECTION PRN
Status: CANCELLED | OUTPATIENT
Start: 2021-11-17

## 2021-11-17 RX ORDER — ALBUTEROL SULFATE 90 UG/1
4 AEROSOL, METERED RESPIRATORY (INHALATION) PRN
Status: CANCELLED | OUTPATIENT
Start: 2021-11-17

## 2021-11-17 RX ORDER — EPINEPHRINE 1 MG/ML
0.3 INJECTION, SOLUTION, CONCENTRATE INTRAVENOUS PRN
OUTPATIENT
Start: 2021-11-17

## 2021-11-17 RX ORDER — ACETAMINOPHEN 325 MG/1
650 TABLET ORAL
OUTPATIENT
Start: 2021-11-17

## 2021-11-17 RX ORDER — SODIUM CHLORIDE 9 MG/ML
20 INJECTION, SOLUTION INTRAVENOUS CONTINUOUS
Status: CANCELLED | OUTPATIENT
Start: 2021-11-17

## 2021-11-17 RX ORDER — ALBUTEROL SULFATE 90 UG/1
4 AEROSOL, METERED RESPIRATORY (INHALATION) PRN
OUTPATIENT
Start: 2021-11-17

## 2021-11-17 RX ORDER — ONDANSETRON 2 MG/ML
8 INJECTION INTRAMUSCULAR; INTRAVENOUS
Status: CANCELLED | OUTPATIENT
Start: 2021-11-17

## 2021-11-17 RX ORDER — SODIUM CHLORIDE 9 MG/ML
INJECTION, SOLUTION INTRAVENOUS CONTINUOUS
Status: CANCELLED | OUTPATIENT
Start: 2021-11-17

## 2021-11-17 RX ORDER — DIPHENHYDRAMINE HYDROCHLORIDE 50 MG/ML
50 INJECTION INTRAMUSCULAR; INTRAVENOUS
OUTPATIENT
Start: 2021-11-17

## 2021-11-17 NOTE — PROGRESS NOTES
Outpatient [id number] [facility]    Peripheral Lab Draw    NAME:  Huseyin Rowan  YOB: 1955  MEDICAL RECORD NUMBER:  [id number]  Episode Date:  11/17/2021    Blood Draw Site: Location: left hand  Site cleansed with Chloroprep Scrub for 30 seconds: Yes  Labs drawn with: 23 gauge             [x] Butterfly    [] Needle  Labs Obtained: Yes  Number of attempts: 1    Lab Test(s) Ordered: Type & Crossmatch and Hemoglobin verification    Lab Draw Site:  Redness: No  Bruising: No   Edema: No  Pain: No     Response to treatment:  Well tolerated by patient. Patient to return Nov. 19, 2021 for PRBC transfusion.     Electronically signed by Jones Colón RN on 11/17/2021 at 6:39 PM

## 2021-11-19 ENCOUNTER — HOSPITAL ENCOUNTER (OUTPATIENT)
Dept: INFUSION THERAPY | Age: 66
Setting detail: INFUSION SERIES
Discharge: HOME | End: 2021-11-19
Payer: MEDICARE

## 2021-11-19 VITALS
RESPIRATION RATE: 17 BRPM | OXYGEN SATURATION: 98 % | HEIGHT: 63 IN | SYSTOLIC BLOOD PRESSURE: 146 MMHG | TEMPERATURE: 97.3 F | BODY MASS INDEX: 21.26 KG/M2 | WEIGHT: 120 LBS | DIASTOLIC BLOOD PRESSURE: 61 MMHG | HEART RATE: 62 BPM

## 2021-11-19 DIAGNOSIS — D63.1 ANEMIA OF CHRONIC RENAL FAILURE, UNSPECIFIED CKD STAGE: Primary | ICD-10-CM

## 2021-11-19 DIAGNOSIS — N18.9 ANEMIA OF CHRONIC RENAL FAILURE, UNSPECIFIED CKD STAGE: Primary | ICD-10-CM

## 2021-11-19 LAB
BLOOD BANK DISPENSE STATUS: NORMAL
BLOOD BANK PRODUCT CODE: NORMAL
BPU ID: NORMAL
DESCRIPTION BLOOD BANK: NORMAL

## 2021-11-19 PROCEDURE — P9016 RBC LEUKOCYTES REDUCED: HCPCS

## 2021-11-19 PROCEDURE — 6370000000 HC RX 637 (ALT 250 FOR IP): Performed by: INTERNAL MEDICINE

## 2021-11-19 PROCEDURE — 2580000003 HC RX 258: Performed by: INTERNAL MEDICINE

## 2021-11-19 PROCEDURE — 36430 TRANSFUSION BLD/BLD COMPNT: CPT

## 2021-11-19 RX ORDER — SODIUM CHLORIDE 9 MG/ML
20 INJECTION, SOLUTION INTRAVENOUS CONTINUOUS
Status: DISCONTINUED | OUTPATIENT
Start: 2021-11-19 | End: 2021-11-20 | Stop reason: HOSPADM

## 2021-11-19 RX ORDER — EZETIMIBE 10 MG/1
10 TABLET ORAL DAILY
COMMUNITY

## 2021-11-19 RX ORDER — ASPIRIN 81 MG/1
81 TABLET, CHEWABLE ORAL DAILY
COMMUNITY

## 2021-11-19 RX ORDER — SODIUM CHLORIDE 0.9 % (FLUSH) 0.9 %
5-40 SYRINGE (ML) INJECTION PRN
OUTPATIENT
Start: 2021-11-19

## 2021-11-19 RX ORDER — ACETAMINOPHEN 325 MG/1
650 TABLET ORAL
OUTPATIENT
Start: 2021-11-19

## 2021-11-19 RX ORDER — ACETAMINOPHEN 325 MG/1
650 TABLET ORAL ONCE
Status: COMPLETED | OUTPATIENT
Start: 2021-11-19 | End: 2021-11-19

## 2021-11-19 RX ORDER — ACETAMINOPHEN 325 MG/1
650 TABLET ORAL ONCE
OUTPATIENT
Start: 2021-11-19 | End: 2021-11-19

## 2021-11-19 RX ORDER — SODIUM CHLORIDE 0.9 % (FLUSH) 0.9 %
5-40 SYRINGE (ML) INJECTION PRN
Status: DISCONTINUED | OUTPATIENT
Start: 2021-11-19 | End: 2021-11-20 | Stop reason: HOSPADM

## 2021-11-19 RX ORDER — CLOPIDOGREL BISULFATE 75 MG/1
75 TABLET ORAL DAILY
COMMUNITY

## 2021-11-19 RX ORDER — EPINEPHRINE 1 MG/ML
0.3 INJECTION, SOLUTION, CONCENTRATE INTRAVENOUS PRN
OUTPATIENT
Start: 2021-11-19

## 2021-11-19 RX ORDER — DIPHENHYDRAMINE HCL 25 MG
25 TABLET ORAL ONCE
OUTPATIENT
Start: 2021-11-19 | End: 2021-11-19

## 2021-11-19 RX ORDER — SODIUM CHLORIDE 9 MG/ML
INJECTION, SOLUTION INTRAVENOUS
Status: DISPENSED
Start: 2021-11-19 | End: 2021-11-19

## 2021-11-19 RX ORDER — ONDANSETRON 2 MG/ML
8 INJECTION INTRAMUSCULAR; INTRAVENOUS
OUTPATIENT
Start: 2021-11-19

## 2021-11-19 RX ORDER — DIPHENHYDRAMINE HCL 25 MG
25 TABLET ORAL ONCE
Status: COMPLETED | OUTPATIENT
Start: 2021-11-19 | End: 2021-11-19

## 2021-11-19 RX ORDER — HYDRALAZINE HYDROCHLORIDE 50 MG/1
50 TABLET, FILM COATED ORAL 3 TIMES DAILY
COMMUNITY

## 2021-11-19 RX ORDER — SODIUM CHLORIDE 9 MG/ML
20 INJECTION, SOLUTION INTRAVENOUS CONTINUOUS
OUTPATIENT
Start: 2021-11-19

## 2021-11-19 RX ORDER — SODIUM CHLORIDE 9 MG/ML
INJECTION, SOLUTION INTRAVENOUS CONTINUOUS
OUTPATIENT
Start: 2021-11-19

## 2021-11-19 RX ORDER — ALBUTEROL SULFATE 90 UG/1
4 AEROSOL, METERED RESPIRATORY (INHALATION) PRN
OUTPATIENT
Start: 2021-11-19

## 2021-11-19 RX ORDER — DIPHENHYDRAMINE HYDROCHLORIDE 50 MG/ML
50 INJECTION INTRAMUSCULAR; INTRAVENOUS
OUTPATIENT
Start: 2021-11-19

## 2021-11-19 RX ORDER — FUROSEMIDE 20 MG/1
20 TABLET ORAL DAILY
COMMUNITY

## 2021-11-19 RX ADMIN — DIPHENHYDRAMINE HCL 25 MG: 25 TABLET ORAL at 08:38

## 2021-11-19 RX ADMIN — SODIUM CHLORIDE 20 ML/HR: 9 INJECTION, SOLUTION INTRAVENOUS at 08:47

## 2021-11-19 RX ADMIN — ACETAMINOPHEN 650 MG: 325 TABLET ORAL at 08:38

## 2021-11-19 ASSESSMENT — PAIN SCALES - GENERAL: PAINLEVEL_OUTOF10: 0

## 2021-11-19 NOTE — PROGRESS NOTES
Outpatient [facility]    Blood Transfusion    NAME:  Tee Robles OF BIRTH:  1955  MEDICAL RECORD NUMBER:  [id number]  DATE:  11/19/2021     Patient arrived to [place] [address]   [] per wheelchair   [] ambulatory         Consent Obtained: Yes  Is this the patient's first Transfusion? No    Did the patient experience any adverse reactions to previous Transfusion? No  Patient Active Problem List   Diagnosis    Upper GI bleed    HAP (hospital-acquired pneumonia)    [facility]    Leukocytosis    Anemia of chronic renal failure     Patient with Bone Marrow Suppression due to chemotherapy? No   Patient with history of GI bleeding? Yes   Patient with history of CHF? No  Patient with history of COPD?  No    Hemoglobin/Hematocrit:    Lab Results   Component Value Date    HGB 8.3 06/28/2021    HCT 24.5 06/28/2021       Special Transfusion Products Requested: No  Blood was:  []  Irradiated    [] Washed    [] Other    Is the patient experiencing any:  Fatigue: yes  []   None  [x]   Increase over baseline but not altering normal activities  []   Moderate of causing difficulty performing some activities  []   Severe or loss of ability to perform some activities  []   Bedridden or disabling    Dizziness or Lightheadedness: no  [x]   None  []   No Interference  []   Interferes with functioning but not activities of daily living  []   Interferes with daily activies  []   Bedridden or disabling    Shortness of Breath: yes  []   None   [x]   Dyspneic on exertion   []   Dyspnea with normal activities  []   Dyspnea at rest    Breath Sounds: No increased work of breathing, Breath sounds clear to auscultation bilaterally and Good air exchange    Edema: none Location of edema: nothing    Tachycardia: No    Heart Palpitations: No      Chest Pain: No    Premedicated:  [] Tylenol 325 mg oral  [] Tylenol 650 mg oral    [] Benadryl 25 mg oral   [] Benadryl 50 mg oral  [] Other    Administered Blood via: [] Peripheral access    [] PICC access    [] Port access    Numbers of units transfused 1 over 3 hours    Monitoring of vital signs and rate changes are documented in flow sheets. Response to treatment:  Well tolerated by patient.     Education:    Indicates understanding      Electronically signed by Cami Pringle RN on 11/19/2021 at 8:49 AM